# Patient Record
Sex: FEMALE | Race: WHITE | NOT HISPANIC OR LATINO
[De-identification: names, ages, dates, MRNs, and addresses within clinical notes are randomized per-mention and may not be internally consistent; named-entity substitution may affect disease eponyms.]

---

## 2017-08-17 ENCOUNTER — APPOINTMENT (OUTPATIENT)
Dept: OBGYN | Facility: CLINIC | Age: 77
End: 2017-08-17
Payer: MEDICARE

## 2017-08-17 VITALS
HEIGHT: 59 IN | WEIGHT: 190 LBS | DIASTOLIC BLOOD PRESSURE: 68 MMHG | SYSTOLIC BLOOD PRESSURE: 128 MMHG | BODY MASS INDEX: 38.3 KG/M2

## 2017-08-17 DIAGNOSIS — R29.890 LOSS OF HEIGHT: ICD-10-CM

## 2017-08-17 DIAGNOSIS — R19.5 OTHER FECAL ABNORMALITIES: ICD-10-CM

## 2017-08-17 LAB — HEMOCCULT SP1 STL QL: NEGATIVE

## 2017-08-17 PROCEDURE — G0101: CPT

## 2017-08-17 PROCEDURE — 82270 OCCULT BLOOD FECES: CPT

## 2018-08-21 ENCOUNTER — APPOINTMENT (OUTPATIENT)
Dept: OBGYN | Facility: CLINIC | Age: 78
End: 2018-08-21
Payer: MEDICARE

## 2018-08-21 VITALS
WEIGHT: 193 LBS | SYSTOLIC BLOOD PRESSURE: 124 MMHG | HEIGHT: 59 IN | DIASTOLIC BLOOD PRESSURE: 80 MMHG | BODY MASS INDEX: 38.91 KG/M2

## 2018-08-21 PROCEDURE — 99214 OFFICE O/P EST MOD 30 MIN: CPT

## 2019-08-17 ENCOUNTER — TRANSCRIPTION ENCOUNTER (OUTPATIENT)
Age: 79
End: 2019-08-17

## 2019-09-04 ENCOUNTER — APPOINTMENT (OUTPATIENT)
Dept: OBGYN | Facility: CLINIC | Age: 79
End: 2019-09-04
Payer: MEDICARE

## 2019-09-04 VITALS
BODY MASS INDEX: 38.3 KG/M2 | DIASTOLIC BLOOD PRESSURE: 90 MMHG | SYSTOLIC BLOOD PRESSURE: 130 MMHG | WEIGHT: 190 LBS | HEIGHT: 59 IN

## 2019-09-04 DIAGNOSIS — Z12.11 ENCOUNTER FOR SCREENING FOR MALIGNANT NEOPLASM OF COLON: ICD-10-CM

## 2019-09-04 LAB
BILIRUB UR QL STRIP: NORMAL
GLUCOSE UR-MCNC: NORMAL
HCG UR QL: 0.2 EU/DL
HEMOCCULT SP1 STL QL: NEGATIVE
HGB UR QL STRIP.AUTO: NORMAL
KETONES UR-MCNC: NORMAL
LEUKOCYTE ESTERASE UR QL STRIP: ABNORMAL
NITRITE UR QL STRIP: NORMAL
PH UR STRIP: 7
PROT UR STRIP-MCNC: NORMAL
SP GR UR STRIP: 1.01

## 2019-09-04 PROCEDURE — 82270 OCCULT BLOOD FECES: CPT

## 2019-09-04 PROCEDURE — 81003 URINALYSIS AUTO W/O SCOPE: CPT | Mod: QW

## 2019-09-04 PROCEDURE — G0101: CPT

## 2019-09-04 RX ORDER — HYDRALAZINE HCL 50 MG
TABLET ORAL
Refills: 0 | Status: ACTIVE | COMMUNITY

## 2019-10-16 ENCOUNTER — MOBILE ON CALL (OUTPATIENT)
Age: 79
End: 2019-10-16

## 2019-10-16 NOTE — HISTORY OF PRESENT ILLNESS
[1 Year Ago] : 1 year ago [Fair] : being in fair health [Healthy Diet] : a healthy diet [Regular Exercise] : regular exercise [Weight Concerns] : weight concens [Last Mammogram ___] : Last Mammogram was [unfilled] [Last Bone Density ___] : Last bone density studies [unfilled] [Last Colonoscopy ___] : Last colonoscopy [unfilled] [Last Pap ___] : Last cervical pap smear was [unfilled] [Postmenopausal] : is postmenopausal [Definite:  ___ (Date)] : the last menstrual period was [unfilled] [Currently In Menopause] : currently in menopause [Hot Flashes] : no hot flashes [Night Sweats] : no night sweats [Experiencing Menopausal Sxs] : not experiencing menopausal symptoms [Sexually Active] : is not sexually active

## 2019-10-16 NOTE — REVIEW OF SYSTEMS
[Arthralgias] : arthralgias [Joint Pain] : joint pain [Nl] : Integumentary [Joint Stiffness] : no joint stiffness

## 2019-10-23 LAB
APPEARANCE: ABNORMAL
BACTERIA: ABNORMAL
BILIRUBIN URINE: NEGATIVE
BLOOD URINE: NEGATIVE
COLOR: YELLOW
GLUCOSE QUALITATIVE U: NEGATIVE
HYALINE CASTS: 0 /LPF
KETONES URINE: NEGATIVE
LEUKOCYTE ESTERASE URINE: ABNORMAL
MICROSCOPIC-UA: NORMAL
NITRITE URINE: NEGATIVE
PH URINE: 8.5
PROTEIN URINE: NORMAL
RED BLOOD CELLS URINE: 1 /HPF
SPECIFIC GRAVITY URINE: 1.01
SQUAMOUS EPITHELIAL CELLS: >27 /HPF
TRIPLE PHOSPHATE CRYSTALS: ABNORMAL
URINE COMMENTS: NORMAL
UROBILINOGEN URINE: NORMAL
WHITE BLOOD CELLS URINE: 16 /HPF

## 2019-10-24 LAB — BACTERIA UR CULT: ABNORMAL

## 2020-02-19 ENCOUNTER — RESULT REVIEW (OUTPATIENT)
Age: 80
End: 2020-02-19

## 2020-12-29 ENCOUNTER — INPATIENT (INPATIENT)
Facility: HOSPITAL | Age: 80
LOS: 2 days | Discharge: HOME | DRG: 287 | End: 2021-01-01
Attending: STUDENT IN AN ORGANIZED HEALTH CARE EDUCATION/TRAINING PROGRAM | Admitting: HOSPITALIST
Payer: MEDICARE

## 2020-12-29 VITALS
SYSTOLIC BLOOD PRESSURE: 225 MMHG | TEMPERATURE: 100 F | DIASTOLIC BLOOD PRESSURE: 107 MMHG | HEART RATE: 59 BPM | RESPIRATION RATE: 20 BRPM | OXYGEN SATURATION: 100 %

## 2020-12-29 DIAGNOSIS — I16.1 HYPERTENSIVE EMERGENCY: ICD-10-CM

## 2020-12-29 DIAGNOSIS — E03.9 HYPOTHYROIDISM, UNSPECIFIED: ICD-10-CM

## 2020-12-29 DIAGNOSIS — Z29.9 ENCOUNTER FOR PROPHYLACTIC MEASURES, UNSPECIFIED: ICD-10-CM

## 2020-12-29 DIAGNOSIS — I10 ESSENTIAL (PRIMARY) HYPERTENSION: ICD-10-CM

## 2020-12-29 LAB
ALBUMIN SERPL ELPH-MCNC: 3.9 G/DL — SIGNIFICANT CHANGE UP (ref 3.3–5)
ALP SERPL-CCNC: 81 U/L — SIGNIFICANT CHANGE UP (ref 40–120)
ALT FLD-CCNC: 30 U/L — SIGNIFICANT CHANGE UP (ref 12–78)
ANION GAP SERPL CALC-SCNC: 6 MMOL/L — SIGNIFICANT CHANGE UP (ref 5–17)
APTT BLD: 34.3 SEC — SIGNIFICANT CHANGE UP (ref 27.5–35.5)
AST SERPL-CCNC: 32 U/L — SIGNIFICANT CHANGE UP (ref 15–37)
BASOPHILS # BLD AUTO: 0.05 K/UL — SIGNIFICANT CHANGE UP (ref 0–0.2)
BASOPHILS NFR BLD AUTO: 0.7 % — SIGNIFICANT CHANGE UP (ref 0–2)
BILIRUB SERPL-MCNC: 0.6 MG/DL — SIGNIFICANT CHANGE UP (ref 0.2–1.2)
BUN SERPL-MCNC: 31 MG/DL — HIGH (ref 7–23)
CALCIUM SERPL-MCNC: 9.8 MG/DL — SIGNIFICANT CHANGE UP (ref 8.5–10.1)
CHLORIDE SERPL-SCNC: 104 MMOL/L — SIGNIFICANT CHANGE UP (ref 96–108)
CO2 SERPL-SCNC: 26 MMOL/L — SIGNIFICANT CHANGE UP (ref 22–31)
CREAT SERPL-MCNC: 1.06 MG/DL — SIGNIFICANT CHANGE UP (ref 0.5–1.3)
EOSINOPHIL # BLD AUTO: 0.19 K/UL — SIGNIFICANT CHANGE UP (ref 0–0.5)
EOSINOPHIL NFR BLD AUTO: 2.5 % — SIGNIFICANT CHANGE UP (ref 0–6)
GLUCOSE SERPL-MCNC: 102 MG/DL — HIGH (ref 70–99)
HCT VFR BLD CALC: 39.5 % — SIGNIFICANT CHANGE UP (ref 34.5–45)
HGB BLD-MCNC: 13.5 G/DL — SIGNIFICANT CHANGE UP (ref 11.5–15.5)
IMM GRANULOCYTES NFR BLD AUTO: 0.5 % — SIGNIFICANT CHANGE UP (ref 0–1.5)
INR BLD: 1 RATIO — SIGNIFICANT CHANGE UP (ref 0.88–1.16)
LYMPHOCYTES # BLD AUTO: 1.43 K/UL — SIGNIFICANT CHANGE UP (ref 1–3.3)
LYMPHOCYTES # BLD AUTO: 18.9 % — SIGNIFICANT CHANGE UP (ref 13–44)
MAGNESIUM SERPL-MCNC: 2.1 MG/DL — SIGNIFICANT CHANGE UP (ref 1.6–2.6)
MCHC RBC-ENTMCNC: 30.4 PG — SIGNIFICANT CHANGE UP (ref 27–34)
MCHC RBC-ENTMCNC: 34.2 GM/DL — SIGNIFICANT CHANGE UP (ref 32–36)
MCV RBC AUTO: 89 FL — SIGNIFICANT CHANGE UP (ref 80–100)
MONOCYTES # BLD AUTO: 0.44 K/UL — SIGNIFICANT CHANGE UP (ref 0–0.9)
MONOCYTES NFR BLD AUTO: 5.8 % — SIGNIFICANT CHANGE UP (ref 2–14)
NEUTROPHILS # BLD AUTO: 5.42 K/UL — SIGNIFICANT CHANGE UP (ref 1.8–7.4)
NEUTROPHILS NFR BLD AUTO: 71.6 % — SIGNIFICANT CHANGE UP (ref 43–77)
NT-PROBNP SERPL-SCNC: 144 PG/ML — SIGNIFICANT CHANGE UP (ref 0–450)
PLATELET # BLD AUTO: 196 K/UL — SIGNIFICANT CHANGE UP (ref 150–400)
POTASSIUM SERPL-MCNC: 4.2 MMOL/L — SIGNIFICANT CHANGE UP (ref 3.5–5.3)
POTASSIUM SERPL-SCNC: 4.2 MMOL/L — SIGNIFICANT CHANGE UP (ref 3.5–5.3)
PROT SERPL-MCNC: 7.6 GM/DL — SIGNIFICANT CHANGE UP (ref 6–8.3)
PROTHROM AB SERPL-ACNC: 11.7 SEC — SIGNIFICANT CHANGE UP (ref 10.6–13.6)
RBC # BLD: 4.44 M/UL — SIGNIFICANT CHANGE UP (ref 3.8–5.2)
RBC # FLD: 13.2 % — SIGNIFICANT CHANGE UP (ref 10.3–14.5)
SODIUM SERPL-SCNC: 136 MMOL/L — SIGNIFICANT CHANGE UP (ref 135–145)
TROPONIN I SERPL-MCNC: 0.02 NG/ML — SIGNIFICANT CHANGE UP (ref 0.01–0.04)
TROPONIN I SERPL-MCNC: 0.6 NG/ML — HIGH (ref 0.01–0.04)
TROPONIN I SERPL-MCNC: 0.84 NG/ML — HIGH (ref 0.01–0.04)
WBC # BLD: 7.57 K/UL — SIGNIFICANT CHANGE UP (ref 3.8–10.5)
WBC # FLD AUTO: 7.57 K/UL — SIGNIFICANT CHANGE UP (ref 3.8–10.5)

## 2020-12-29 PROCEDURE — C1769: CPT

## 2020-12-29 PROCEDURE — 36415 COLL VENOUS BLD VENIPUNCTURE: CPT

## 2020-12-29 PROCEDURE — 84443 ASSAY THYROID STIM HORMONE: CPT

## 2020-12-29 PROCEDURE — 83036 HEMOGLOBIN GLYCOSYLATED A1C: CPT

## 2020-12-29 PROCEDURE — 82962 GLUCOSE BLOOD TEST: CPT

## 2020-12-29 PROCEDURE — C1894: CPT

## 2020-12-29 PROCEDURE — 85027 COMPLETE CBC AUTOMATED: CPT

## 2020-12-29 PROCEDURE — 84484 ASSAY OF TROPONIN QUANT: CPT

## 2020-12-29 PROCEDURE — 85730 THROMBOPLASTIN TIME PARTIAL: CPT

## 2020-12-29 PROCEDURE — 95816 EEG AWAKE AND DROWSY: CPT

## 2020-12-29 PROCEDURE — 82533 TOTAL CORTISOL: CPT

## 2020-12-29 PROCEDURE — C1887: CPT

## 2020-12-29 PROCEDURE — 80061 LIPID PANEL: CPT

## 2020-12-29 PROCEDURE — 93306 TTE W/DOPPLER COMPLETE: CPT

## 2020-12-29 PROCEDURE — 70450 CT HEAD/BRAIN W/O DYE: CPT | Mod: 26

## 2020-12-29 PROCEDURE — 93010 ELECTROCARDIOGRAM REPORT: CPT | Mod: 76

## 2020-12-29 PROCEDURE — C1760: CPT

## 2020-12-29 PROCEDURE — A9500: CPT

## 2020-12-29 PROCEDURE — 86769 SARS-COV-2 COVID-19 ANTIBODY: CPT

## 2020-12-29 PROCEDURE — 78452 HT MUSCLE IMAGE SPECT MULT: CPT

## 2020-12-29 PROCEDURE — 93458 L HRT ARTERY/VENTRICLE ANGIO: CPT

## 2020-12-29 PROCEDURE — 99223 1ST HOSP IP/OBS HIGH 75: CPT

## 2020-12-29 PROCEDURE — 70450 CT HEAD/BRAIN W/O DYE: CPT

## 2020-12-29 PROCEDURE — G0269: CPT

## 2020-12-29 PROCEDURE — 80048 BASIC METABOLIC PNL TOTAL CA: CPT

## 2020-12-29 PROCEDURE — U0003: CPT

## 2020-12-29 PROCEDURE — 71045 X-RAY EXAM CHEST 1 VIEW: CPT | Mod: 26

## 2020-12-29 RX ORDER — AMLODIPINE BESYLATE 2.5 MG/1
5 TABLET ORAL DAILY
Refills: 0 | Status: DISCONTINUED | OUTPATIENT
Start: 2020-12-29 | End: 2020-12-30

## 2020-12-29 RX ORDER — PROPRANOLOL HCL 160 MG
60 CAPSULE, EXTENDED RELEASE 24HR ORAL DAILY
Refills: 0 | Status: DISCONTINUED | OUTPATIENT
Start: 2020-12-29 | End: 2020-12-30

## 2020-12-29 RX ORDER — VALSARTAN 80 MG/1
320 TABLET ORAL DAILY
Refills: 0 | Status: DISCONTINUED | OUTPATIENT
Start: 2020-12-29 | End: 2021-01-01

## 2020-12-29 RX ORDER — LABETALOL HCL 100 MG
20 TABLET ORAL ONCE
Refills: 0 | Status: COMPLETED | OUTPATIENT
Start: 2020-12-29 | End: 2020-12-29

## 2020-12-29 RX ORDER — ASPIRIN/CALCIUM CARB/MAGNESIUM 324 MG
81 TABLET ORAL DAILY
Refills: 0 | Status: DISCONTINUED | OUTPATIENT
Start: 2020-12-29 | End: 2021-01-01

## 2020-12-29 RX ORDER — LEVOTHYROXINE SODIUM 125 MCG
25 TABLET ORAL DAILY
Refills: 0 | Status: DISCONTINUED | OUTPATIENT
Start: 2020-12-29 | End: 2021-01-01

## 2020-12-29 RX ORDER — ASPIRIN/CALCIUM CARB/MAGNESIUM 324 MG
325 TABLET ORAL ONCE
Refills: 0 | Status: COMPLETED | OUTPATIENT
Start: 2020-12-29 | End: 2020-12-29

## 2020-12-29 RX ORDER — LABETALOL HCL 100 MG
10 TABLET ORAL ONCE
Refills: 0 | Status: COMPLETED | OUTPATIENT
Start: 2020-12-29 | End: 2020-12-29

## 2020-12-29 RX ORDER — HYDRALAZINE HCL 50 MG
50 TABLET ORAL
Refills: 0 | Status: DISCONTINUED | OUTPATIENT
Start: 2020-12-29 | End: 2020-12-30

## 2020-12-29 RX ORDER — ENOXAPARIN SODIUM 100 MG/ML
40 INJECTION SUBCUTANEOUS EVERY 12 HOURS
Refills: 0 | Status: DISCONTINUED | OUTPATIENT
Start: 2020-12-29 | End: 2020-12-30

## 2020-12-29 RX ORDER — HYDRALAZINE HCL 50 MG
5 TABLET ORAL ONCE
Refills: 0 | Status: COMPLETED | OUTPATIENT
Start: 2020-12-29 | End: 2020-12-29

## 2020-12-29 RX ADMIN — Medication 20 MILLIGRAM(S): at 18:27

## 2020-12-29 RX ADMIN — ENOXAPARIN SODIUM 40 MILLIGRAM(S): 100 INJECTION SUBCUTANEOUS at 21:26

## 2020-12-29 RX ADMIN — Medication 50 MILLIGRAM(S): at 21:26

## 2020-12-29 RX ADMIN — Medication 10 MILLIGRAM(S): at 15:38

## 2020-12-29 RX ADMIN — Medication 10 MILLIGRAM(S): at 17:19

## 2020-12-29 RX ADMIN — Medication 325 MILLIGRAM(S): at 19:37

## 2020-12-29 RX ADMIN — AMLODIPINE BESYLATE 5 MILLIGRAM(S): 2.5 TABLET ORAL at 22:22

## 2020-12-29 RX ADMIN — Medication 5 MILLIGRAM(S): at 23:48

## 2020-12-29 NOTE — ED PROVIDER NOTE - PROGRESS NOTE DETAILS
Cherry HARRINGTON for ED attending, Dr. Davalos: Spoke with tele hospitalist for admission after speaking with Dr. Noriega hospitalist will admit pt. I Gladys Gu attest that this documentation has been prepared under the direction and in the presence of Doctor Davalos .

## 2020-12-29 NOTE — ED ADULT NURSE REASSESSMENT NOTE - NS ED NURSE REASSESS COMMENT FT1
Dr. Davalos informed of Troponin lab results. Repeat EKG in progress. Pt denies CP at this time. Will continue to monitor

## 2020-12-29 NOTE — H&P ADULT - ATTENDING COMMENTS
pt seen and evaluated after tele hospitalist evaluation. Bedside physical performed. Refer to above for detailed plan      #Hypertensive emergency  -possibly due to recent BB titration  -given hydralazine and labetalol in the ED  -resume home meds  -get morning echo  -monitor bp    #Elevated Troponin  -likely secondary to above  -monitor on tele  -trend troponin  -endorsed intermittent chest pain  -get echo  -serial EKG  -get cards evaluation

## 2020-12-29 NOTE — H&P ADULT - HISTORY OF PRESENT ILLNESS
81 y/o female with PMHx of HTN, Alopecia totalis, Diverticulitis, Diverticulosis, Hypothyroidism presents to the ED c/o CP. Pt went to PMD last week and found to have low pulse. PMD decreased HTN medication, Inderal from 120 to 60 mg daily. Patient reports she woke up this morning feeling fine, then started to feel dizzy, noticed blurred vision, noted some left-sided CP, back pain. Used a BP machine and noted her BP was in the 200s. Called cardiologist, Dr. Moreno, who instructed her to come to ED. Family hHx of HTN and CAD. Denies smoking or Etoh. Reports adherence to medication regimen    In the ED, VS Temp 99.8 HR: 59 BP: 225/107à 182/69 R; 20 SpO2 100% on RA . Labs included CBC wnl. PTT/PT/INR wnl. BMP wnl. LFTS unremarkable. Trop I 0.020, 0.0602.  EKG per ED documentation was noted for no ST changes. Imaging included CXR, which was neg for acute cardiopulmonary abnormalities.  Given a total of Labetalol 40 mg IV and  mg and admitted to medicine for further management.  Patient is currently denies any headache lightheaded, blurred vision, chest pain, Sob, n/v, abdominal or back pain, numbness, tingling, fevers or chills.

## 2020-12-29 NOTE — ED ADULT NURSE NOTE - OBJECTIVE STATEMENT
80y female presents to ED with hypertension and slight chest pain. Pt states that she takes BP meds at home. Pt states that her PCP recently lowered her Indaril dose in half due to low pulse rate. Pt states that she had vision changes last night while watching TV. Slight headache at this time. Denies SOB 80y female presents to ED with hypertension and slight chest pain. Pt states that she takes BP meds at home. Pt states that her PCP recently lowered her Indaril dose in half due to low pulse rate. Pt states that she had vision changes last night while watching TV, but is now back at baseline. Slight headache at this time 3/10. Denies SOB at this time 80y female presents to ED with hypertension and slight chest pain. Pt states that she takes BP meds at home. Pt states that her PCP recently lowered her Indaril dose in half due to low pulse rate. Pt states that she had vision changes last night while watching TV, but is now back at baseline. Slight headache at this time 3/10. Denies SOB at this time. Pt states that there is an extensive family hx of HTN

## 2020-12-29 NOTE — ED PROVIDER NOTE - NS_ ATTENDINGSCRIBEDETAILS _ED_A_ED_FT
I, Jake Davalos MD,  performed the initial face to face bedside interview with this patient regarding history of present illness, review of symptoms and relevant past medical, social and family history.  I completed an independent physical examination.    The history, relevant review of systems, past medical and surgical history, medical decision making, and physical examination was documented by the scribe in my presence and I attest to the accuracy of the documentation.

## 2020-12-29 NOTE — ED PROVIDER NOTE - PMH
Alopecia (Capitis) Totalis  wears wig  Diverticulitis of Large Intestine  on and off since 2003. Tx with Abx and bowel rest on prior exacerbations  Diverticulosis of the Colon    H/O: Hypothyroidism  in remote past, no longer needs med  History of Obesity    HTN (Hypertension)

## 2020-12-29 NOTE — H&P ADULT - NSICDXPASTSURGICALHX_GEN_ALL_CORE_FT
PAST SURGICAL HISTORY:  Hemorrhoids had surgery to correct     (Normal Spontaneous Vaginal Delivery) x2

## 2020-12-29 NOTE — ED PROVIDER NOTE - OBJECTIVE STATEMENT
81 y/o female with PMHx of HTN, Alopecia totalis, Diverticulitis, Diverticulosis, Hypothyroidism presents to the ED c/o CP. Pt went to PMD last week and found to have low pulse. PMD decreased HTN medication, Inderal. Pt woke up this morning feeling fine, then started to feel dizzy, CP, back pain. Used a BP machine and noted her BP was in the 200s. Called cardiologist, Dr. Moreno, who instructed her to come to ED.

## 2020-12-29 NOTE — H&P ADULT - ASSESSMENT
81 y/o female with PMHx of HTN, Alopecia totalis, Diverticulitis, Diverticulosis, Hypothyroidism presents to the ED c/o CP found to elevated troponin without EKGs changes likely 2/2 to demand ischemia in the setting of hypertensive emergency. Admitted for cardio/neuro workup

## 2020-12-29 NOTE — H&P ADULT - NSICDXPASTMEDICALHX_GEN_ALL_CORE_FT
PAST MEDICAL HISTORY:  Alopecia (Capitis) Totalis wears wig    Diverticulitis of Large Intestine on and off since 2003. Tx with Abx and bowel rest on prior exacerbations    Diverticulosis of the Colon     H/O: Hypothyroidism in remote past, no longer needs med    History of Obesity     HTN (Hypertension)

## 2020-12-29 NOTE — H&P ADULT - PROBLEM SELECTOR PLAN 1
BP goal is reduction of BP by 10-10% in 1 hour and 15 percent in the next 23 hours. So here targeted BP is <160/110 currently patient is 182/62. Per patient BP is usually 140s-150s/ 70s-80s   Restart home medications; Inderal 60 mg XR, HTCZ 12.5 mg, Hydralazine 50 mg BID, Valsartan 320 mg daily , would increase morning HTCZ  and consider switching to chlothidaone  for better BP control long term BP control or start amlodipine 5 mg   Cards consult in the AM  HR is now in 50s, would switch hydralazine IV or CCB     Obtain CT head w/o contrast  given c/o  dizziness and blurred vision upon  Trend Trops  Cont on Tele   s/p  mg, cont with ASA 81 mg daily   Trend Cr

## 2020-12-30 LAB
A1C WITH ESTIMATED AVERAGE GLUCOSE RESULT: 5.6 % — SIGNIFICANT CHANGE UP (ref 4–5.6)
ANION GAP SERPL CALC-SCNC: 3 MMOL/L — LOW (ref 5–17)
APTT BLD: 61.7 SEC — HIGH (ref 27.5–35.5)
BUN SERPL-MCNC: 24 MG/DL — HIGH (ref 7–23)
CALCIUM SERPL-MCNC: 10.1 MG/DL — SIGNIFICANT CHANGE UP (ref 8.5–10.1)
CHLORIDE SERPL-SCNC: 107 MMOL/L — SIGNIFICANT CHANGE UP (ref 96–108)
CHOLEST SERPL-MCNC: 217 MG/DL — HIGH
CO2 SERPL-SCNC: 28 MMOL/L — SIGNIFICANT CHANGE UP (ref 22–31)
CREAT SERPL-MCNC: 1.05 MG/DL — SIGNIFICANT CHANGE UP (ref 0.5–1.3)
ESTIMATED AVERAGE GLUCOSE: 114 MG/DL — SIGNIFICANT CHANGE UP (ref 68–114)
GLUCOSE SERPL-MCNC: 120 MG/DL — HIGH (ref 70–99)
HCT VFR BLD CALC: 36.6 % — SIGNIFICANT CHANGE UP (ref 34.5–45)
HDLC SERPL-MCNC: 66 MG/DL — SIGNIFICANT CHANGE UP
HGB BLD-MCNC: 12.6 G/DL — SIGNIFICANT CHANGE UP (ref 11.5–15.5)
LIPID PNL WITH DIRECT LDL SERPL: 128 MG/DL — HIGH
MCHC RBC-ENTMCNC: 30.2 PG — SIGNIFICANT CHANGE UP (ref 27–34)
MCHC RBC-ENTMCNC: 34.4 GM/DL — SIGNIFICANT CHANGE UP (ref 32–36)
MCV RBC AUTO: 87.8 FL — SIGNIFICANT CHANGE UP (ref 80–100)
NON HDL CHOLESTEROL: 150 MG/DL — HIGH
PLATELET # BLD AUTO: 184 K/UL — SIGNIFICANT CHANGE UP (ref 150–400)
POTASSIUM SERPL-MCNC: 3.8 MMOL/L — SIGNIFICANT CHANGE UP (ref 3.5–5.3)
POTASSIUM SERPL-SCNC: 3.8 MMOL/L — SIGNIFICANT CHANGE UP (ref 3.5–5.3)
RBC # BLD: 4.17 M/UL — SIGNIFICANT CHANGE UP (ref 3.8–5.2)
RBC # FLD: 13.4 % — SIGNIFICANT CHANGE UP (ref 10.3–14.5)
SARS-COV-2 IGG SERPL QL IA: NEGATIVE — SIGNIFICANT CHANGE UP
SARS-COV-2 IGM SERPL IA-ACNC: 0.07 INDEX — SIGNIFICANT CHANGE UP
SARS-COV-2 RNA SPEC QL NAA+PROBE: SIGNIFICANT CHANGE UP
SODIUM SERPL-SCNC: 138 MMOL/L — SIGNIFICANT CHANGE UP (ref 135–145)
TRIGL SERPL-MCNC: 110 MG/DL — SIGNIFICANT CHANGE UP
TROPONIN I SERPL-MCNC: 0.81 NG/ML — HIGH (ref 0.01–0.04)
TROPONIN I SERPL-MCNC: 0.94 NG/ML — HIGH (ref 0.01–0.04)
WBC # BLD: 7.24 K/UL — SIGNIFICANT CHANGE UP (ref 3.8–10.5)
WBC # FLD AUTO: 7.24 K/UL — SIGNIFICANT CHANGE UP (ref 3.8–10.5)

## 2020-12-30 PROCEDURE — 99233 SBSQ HOSP IP/OBS HIGH 50: CPT

## 2020-12-30 PROCEDURE — 95816 EEG AWAKE AND DROWSY: CPT | Mod: 26

## 2020-12-30 PROCEDURE — 70450 CT HEAD/BRAIN W/O DYE: CPT | Mod: 26

## 2020-12-30 PROCEDURE — 78452 HT MUSCLE IMAGE SPECT MULT: CPT | Mod: 26

## 2020-12-30 PROCEDURE — 99223 1ST HOSP IP/OBS HIGH 75: CPT

## 2020-12-30 RX ORDER — HEPARIN SODIUM 5000 [USP'U]/ML
5000 INJECTION INTRAVENOUS; SUBCUTANEOUS ONCE
Refills: 0 | Status: COMPLETED | OUTPATIENT
Start: 2020-12-30 | End: 2020-12-30

## 2020-12-30 RX ORDER — HEPARIN SODIUM 5000 [USP'U]/ML
5300 INJECTION INTRAVENOUS; SUBCUTANEOUS EVERY 6 HOURS
Refills: 0 | Status: DISCONTINUED | OUTPATIENT
Start: 2020-12-30 | End: 2020-12-30

## 2020-12-30 RX ORDER — LABETALOL HCL 100 MG
200 TABLET ORAL
Refills: 0 | Status: DISCONTINUED | OUTPATIENT
Start: 2020-12-30 | End: 2020-12-30

## 2020-12-30 RX ORDER — HEPARIN SODIUM 5000 [USP'U]/ML
INJECTION INTRAVENOUS; SUBCUTANEOUS
Qty: 25000 | Refills: 0 | Status: DISCONTINUED | OUTPATIENT
Start: 2020-12-30 | End: 2020-12-30

## 2020-12-30 RX ORDER — HEPARIN SODIUM 5000 [USP'U]/ML
5000 INJECTION INTRAVENOUS; SUBCUTANEOUS ONCE
Refills: 0 | Status: DISCONTINUED | OUTPATIENT
Start: 2020-12-30 | End: 2020-12-30

## 2020-12-30 RX ORDER — LABETALOL HCL 100 MG
100 TABLET ORAL EVERY 12 HOURS
Refills: 0 | Status: DISCONTINUED | OUTPATIENT
Start: 2020-12-30 | End: 2021-01-01

## 2020-12-30 RX ORDER — ENOXAPARIN SODIUM 100 MG/ML
40 INJECTION SUBCUTANEOUS DAILY
Refills: 0 | Status: DISCONTINUED | OUTPATIENT
Start: 2020-12-30 | End: 2021-01-01

## 2020-12-30 RX ADMIN — Medication 100 MILLIGRAM(S): at 21:53

## 2020-12-30 RX ADMIN — HEPARIN SODIUM 1000 UNIT(S)/HR: 5000 INJECTION INTRAVENOUS; SUBCUTANEOUS at 11:04

## 2020-12-30 RX ADMIN — HEPARIN SODIUM 5000 UNIT(S): 5000 INJECTION INTRAVENOUS; SUBCUTANEOUS at 04:00

## 2020-12-30 RX ADMIN — HEPARIN SODIUM 1000 UNIT(S)/HR: 5000 INJECTION INTRAVENOUS; SUBCUTANEOUS at 04:00

## 2020-12-30 RX ADMIN — Medication 200 MILLIGRAM(S): at 09:04

## 2020-12-30 RX ADMIN — VALSARTAN 320 MILLIGRAM(S): 80 TABLET ORAL at 09:05

## 2020-12-30 RX ADMIN — Medication 50 MILLIGRAM(S): at 09:04

## 2020-12-30 RX ADMIN — ENOXAPARIN SODIUM 40 MILLIGRAM(S): 100 INJECTION SUBCUTANEOUS at 19:02

## 2020-12-30 RX ADMIN — Medication 81 MILLIGRAM(S): at 10:45

## 2020-12-30 RX ADMIN — Medication 25 MICROGRAM(S): at 05:47

## 2020-12-30 NOTE — PROVIDER CONTACT NOTE (CHANGE IN STATUS NOTIFICATION) - ASSESSMENT
Patient unresponsive for several minutes. B/P 176/121 HR 49 Patient lost control of bladder, eyes fixed.

## 2020-12-30 NOTE — ED ADULT NURSE REASSESSMENT NOTE - NS ED NURSE REASSESS COMMENT FT1
dr. cowan made aware of third troponin result of 0.838. repeat troponin being drawn now. patient denies chest pain. patient resting comfortably in stretcher. will continue to monitor.

## 2020-12-30 NOTE — CONSULT NOTE ADULT - SUBJECTIVE AND OBJECTIVE BOX
CC: 80 y old  Female who presents with a chief complaint of Chest pain / Hypertensive emergency (30 Dec 2020 06:53)      HPI:  81 y/o female with PMHx of HTN, Alopecia totalis, Diverticulitis, Hypothyroidism presents to the ED with c/o dizziness, blurred vision, noted some left-sided CP, back pain, checked her BP was in the 200s ( PMD had decreased HTN medication, Inderal from 120 to 60 mg daily) In the ED, HR: 59 BP: 225/107 - 182/69; Trop I 0.020, 0.0602. Pt was admitted to [facility], treated with antihypertensive meds, she went for Nuclear stress test tis morning, was geiven Labetalol, Hydralizine and Valsartan before the test.    At 9.36 hours today while pt was having Nuclear stress test she became unresponsive, Code stroke was called, she was noted to have eye rolling movements and urinary incontinnece, was unresponsive breifly, /120-161/75; hr 59,  came around instantly, is not aware of what happened, NIHSS - 0, pt sent for stat CT head - was negative    ROS: denies headache lightheaded, blurred vision, chest pain, Sob, n/v, numbness, tinglings.    (29 Dec 2020 21:07)      PAST MEDICAL & SURGICAL HISTORY:  Alopecia (Capitis) Totalis  Obesity  Diverticulitis of Large Intestine  H/O: Hypothyroidism  HTN (Hypertension)  Hemorrhoids   (Normal Spontaneous Vaginal Delivery)      FAMILY HISTORY:  FH: HTN (hypertension)      Social Hx:  Nonsmoker, no drug or alcohol use      MEDICATIONS  (STANDING):  aspirin enteric coated 81 milliGRAM(s) Oral daily  heparin  Infusion.  Unit(s)/Hr (10 mL/Hr) IV Continuous <Continuous>  hydrALAZINE 50 milliGRAM(s) Oral two times a day  hydrochlorothiazide Oral Tab/Cap - Peds 12.5 milliGRAM(s) Oral daily  labetalol 200 milliGRAM(s) Oral two times a day  levothyroxine 25 MICROGram(s) Oral daily  valsartan 320 milliGRAM(s) Oral daily       Allergies    Bactrim (Rash)  Cipro (Unknown)  codeine (Other)  dust (Rhinitis)  Flagyl (Diarrhea)  Keflex (Unknown)  Levaquin (Other)  Levaquin side effect is muscle pain not rigors as previously indicated. Unable to change in [place] (Other)  mold, trees, grasses, dust mites (Rhinitis; Rhinorrhea; Sneezing)  morphine (Other)  Originally Entered as [Unknown] reaction to [sulfur] (Unknown)  penicillin (Swelling)      ROS: Pertinent positives in HPI, all other ROS were reviewed and are negative.        Vital Signs Last 24 Hrs  T(C): 36.4 (30 Dec 2020 10:15), Max: 37.7 (29 Dec 2020 14:14)  T(F): 97.5 (30 Dec 2020 10:15), Max: 99.8 (29 Dec 2020 14:14)  HR: 53 (30 Dec 2020 10:15) (53 - 68)  BP: 109/53 (30 Dec 2020 10:15) (109/53 - 225/107)  BP(mean): 105 (29 Dec 2020 17:18) (105 - 121)  RR: 16 (30 Dec 2020 10:15) (16 - 20)  SpO2: 98% (30 Dec 2020 10:15) (97% - 100%)      Gen exam:  Normocephalic with Alopecia, awake and alert.  HEENT: PERRLA, EOMI,   Neck: Supple.  Respiratory: Breath sounds are clear bilaterally  Cardiovascular: S1 and S2, regular / irregular rhythm  Extremities:  no edema  Vascular: Caritid Bruit - no  Musculoskeletal:  no abnormal movements  Skin: No rashes    Neurological exam:  HF: A x O x 3. Appropriately interactive, normal affect. Speech fluent, No Aphasia or paraphasic errors. Naming /repetition intact   CN: PARAM, EOMI, VFF, facial sensation normal, no NLFD, tongue midline, Palate moves equally, SCM equal bilaterally  Motor: No pronator drift, Strength 5/5 in all 4 ext, normal bulk and tone, no tremor.    Sens: Intact to light touch / PP    Reflexes: Symmetric and normal . downgoing toes b/l  Coord:  No FNFA, dysmetria, SUMAN intact   Gait/Balance: Not tested    NIHSS: 0          Labs:   12-30    138  |  107  |  24<H>  ----------------------------<  120<H>  3.8   |  28  |  1.05    Ca    10.1      30 Dec 2020 06:09  Mg     2.1     12-29    TPro  7.6  /  Alb  3.9  /  TBili  0.6  /  DBili  x   /  AST  32  /  ALT  30  /  AlkPhos  81                            12.6   7.24  )-----------( 184      ( 30 Dec 2020 10:25 )             36.6       Radiology:  < from: CT Brain Stroke Protocol (20 @ 10:05) >  FINDINGS:    There is no acute intracranial hemorrhage or mass effect. There are areas of hypodensity in the bilateral hemispheric white matter suggesting white matter microvascular ischemic change. There is cerebral volume loss.    There is no extraaxial fluid collection.    There is no displaced calvarial fracture. The visualized orbits are within normal limits. The visualized portions of the paranasal sinuses are well aerated. The mastoid air cells are well aerated.      IMPRESSION: No acute intracranial hemorrhage or mass effect.  If clinical suspicion for acute infarct persists, brain MRI can be obtained if there is no contraindication.    These findings were discussed with Dr. Barcenas at 2020 10:24 AM by Dr. Duke Welsh with read back confirmation.            < end of copied text >    
Patient is a 80y old  Female who presents with a chief complaint of Chest pain Hypertensive emergency (29 Dec 2020 21:07)      HPI:  79 y/o female with PMHx of HTN, Alopecia totalis, Diverticulitis, Diverticulosis, Hypothyroidism presents to the ED c/o CP. Pt went to PMD last week and found to have low pulse. PMD decreased HTN medication, Inderal from 120 to 60 mg daily. Patient reports she woke up this morning feeling fine, then started to feel dizzy, noticed blurred vision, noted some left-sided CP, back pain. Used a BP machine and noted her BP was in the 200s. Called cardiologist, Dr. Moreno, who instructed her to come to ED. Family hHx of HTN and CAD. Denies smoking or Etoh. Reports adherence to medication regimen    In the ED, VS Temp 99.8 HR: 59 BP: 225/107à 182/69 R; 20 SpO2 100% on RA . Labs included CBC wnl. PTT/PT/INR wnl. BMP wnl. LFTS unremarkable. Trop I 0.020, 0.0602.  EKG per ED documentation was noted for no ST changes. Imaging included CXR, which was neg for acute cardiopulmonary abnormalities.  Given a total of Labetalol 40 mg IV and  mg and admitted to medicine for further management.  Patient is currently denies any headache lightheaded, blurred vision, chest pain, Sob, n/v, abdominal or back pain, numbness, tingling, fevers or chills.    (29 Dec 2020 21:07)    She takes inderal , hydralzine,valsarten at home , currently Sx free  PAST MEDICAL & SURGICAL HISTORY:  Alopecia (Capitis) Totalis  wears wig    History of Obesity    Diverticulitis of Large Intestine  on and off since . Tx with Abx and bowel rest on prior exacerbations    Diverticulosis of the Colon    H/O: Hypothyroidism  in remote past, no longer needs med    HTN (Hypertension)    Hemorrhoids  had surgery to correct     (Normal Spontaneous Vaginal Delivery)  x2                                      MEDICATIONS  (STANDING):  amLODIPine   Tablet 5 milliGRAM(s) Oral daily  aspirin enteric coated 81 milliGRAM(s) Oral daily  heparin  Infusion.  Unit(s)/Hr (10 mL/Hr) IV Continuous <Continuous>  hydrALAZINE 50 milliGRAM(s) Oral two times a day  hydrochlorothiazide Oral Tab/Cap - Peds 12.5 milliGRAM(s) Oral daily  levothyroxine 25 MICROGram(s) Oral daily  propranolol LA 60 milliGRAM(s) Oral daily  valsartan 320 milliGRAM(s) Oral daily    MEDICATIONS  (PRN):  heparin   Injectable 5300 Unit(s) IV Push every 6 hours PRN For aPTT less than 40      FAMILY HISTORY:  FH: HTN (hypertension)        SOCIAL HISTORY:    CIGARETTES:        Vital Signs Last 24 Hrs  T(C): 37.1 (30 Dec 2020 05:35), Max: 37.7 (29 Dec 2020 14:14)  T(F): 98.8 (30 Dec 2020 05:35), Max: 99.8 (29 Dec 2020 14:14)  HR: 64 (30 Dec 2020 05:35) (59 - 68)  BP: 175/71 (30 Dec 2020 05:35) (169/55 - 225/107)  BP(mean): 105 (29 Dec 2020 17:18) (105 - 121)  RR: 18 (30 Dec 2020 05:35) (18 - 20)  SpO2: 97% (30 Dec 2020 05:35) (97% - 100%)            INTERPRETATION OF TELEMETRY:    ECG:    I&O's Detail      LABS:                        13.5   7.57  )-----------( 196      ( 29 Dec 2020 14:41 )             39.5         136  |  104  |  31<H>  ----------------------------<  102<H>  4.2   |  26  |  1.06    Ca    9.8      29 Dec 2020 14:41  Mg     2.1         TPro  7.6  /  Alb  3.9  /  TBili  0.6  /  DBili  x   /  AST  32  /  ALT  30  /  AlkPhos  81  12    CARDIAC MARKERS ( 30 Dec 2020 01:19 )  0.940 ng/mL / x     / x     / x     / x      CARDIAC MARKERS ( 29 Dec 2020 22:44 )  0.838 ng/mL / x     / x     / x     / x      CARDIAC MARKERS ( 29 Dec 2020 18:13 )  0.602 ng/mL / x     / x     / x     / x      CARDIAC MARKERS ( 29 Dec 2020 14:41 )  0.020 ng/mL / x     / x     / x     / x          PT/INR - ( 29 Dec 2020 14:41 )   PT: 11.7 sec;   INR: 1.00 ratio         PTT - ( 29 Dec 2020 14:41 )  PTT:34.3 sec    I&O's Summary    BNPSerum Pro-Brain Natriuretic Peptide: 144 pg/mL ( @ 14:41)    RADIOLOGY & ADDITIONAL STUDIES:

## 2020-12-30 NOTE — PROVIDER CONTACT NOTE (CHANGE IN STATUS NOTIFICATION) - ACTION/TREATMENT ORDERED:
Patient responding. Speaking appropriately, A&Ox3 no slurring noted. Weak. Sent for CT of head. Stress test cancelled.

## 2020-12-30 NOTE — CONSULT NOTE ADULT - ASSESSMENT
79 y/o female with PMHx of HTN, Alopecia totalis, Diverticulitis, Hypothyroidism presents to the ED with c/o dizziness, blurred vision, noted some left-sided CP, back pain, noted to have uncontrolled HTN medication; BP: 225/107 - 182/69; Trop I 0.020, 0.0602. Pt went for Nuclear stress test this morning, was geiven Labetalol, Hydralizine and Valsartan before the test, At 9.36 hours today while pt was having Nuclear stress test she became unresponsive, Code stroke was called, she was noted to have eye rolling movements, urinary incontinnece, was unresponsive breifly, /120-161/75; hr 59,  came around instantly, is not aware of what happened, NIHSS - 0, stat CT head - was negative (seen in [facility] at about 8.30 by RN)    # Most likely a brief seizure    - Obtain EEG today, if abnormal will consider treatment options, otherwise observation    # unlikley TIA or stroke, given Symptoms, not  a Tpa candidate    Above D/W pt and Swapna Godinez
79 y/o female with PMHx of HTN, Alopecia totalis, Diverticulitis, Diverticulosis, Hypothyroidism presents to the ED c/o CP. Pt went to PMD last week and found to have low pulse. PMD decreased HTN medication, Inderal from 120 to 60 mg daily. Patient reports she woke up this morning feeling fine, then started to feel dizzy, noticed blurred vision, noted some left-sided CP, back pain. Used a BP machine and noted her BP was in the 200s. Called cardiologist, Dr. Moreno, who instructed her to come to ED. Family hHx of HTN and CAD. Denies smoking or Etoh. Reports adherence to medication regimen    In the ED, VS Temp 99.8 HR: 59 BP: 225/107à 182/69 R; 20 SpO2 100% on RA . Labs included CBC wnl. PTT/PT/INR wnl. BMP wnl. LFTS unremarkable. Trop I 0.020, 0.0602.  EKG per ED documentation was noted for no ST changes. Imaging included CXR, which was neg for acute cardiopulmonary abnormalities.  Given a total of Labetalol 40 mg IV and  mg and admitted to medicine for further management.  Patient is currently denies any headache lightheaded, blurred vision, chest pain, Sob, n/v, abdominal or back pain, numbness, tingling, fevers or chills.    (29 Dec 2020 21:07)    She takes inderal , hydralzine,valsarten at home , currently Sx free  pos trop trending up likely from sig HTN but need to r/o active ischemia  1) SPECT today if negative can DC home once BP better controlled   2) change inderal to labetolol 200 mg BID hold for HR less than 50   3) cont Valsarten/ hydralazine DC amlodipine   4) echo either as inpt or oupt

## 2020-12-30 NOTE — PROVIDER CONTACT NOTE (CHANGE IN STATUS NOTIFICATION) - BACKGROUND
Bradycardia with severe hypertension on admission. C/o dizziness and blurred vision. Trops elevated.  In department for cardiac stress test.

## 2020-12-30 NOTE — PROVIDER CONTACT NOTE (CHANGE IN STATUS NOTIFICATION) - SITUATION
Patient in Nuclear medicine just finished rest imaging.  According to tech patient c/o feeling dizzy, started to fall back and became unresponsive.

## 2020-12-31 LAB
HCT VFR BLD CALC: 35.2 % — SIGNIFICANT CHANGE UP (ref 34.5–45)
HGB BLD-MCNC: 11.9 G/DL — SIGNIFICANT CHANGE UP (ref 11.5–15.5)
MCHC RBC-ENTMCNC: 30.6 PG — SIGNIFICANT CHANGE UP (ref 27–34)
MCHC RBC-ENTMCNC: 33.8 GM/DL — SIGNIFICANT CHANGE UP (ref 32–36)
MCV RBC AUTO: 90.5 FL — SIGNIFICANT CHANGE UP (ref 80–100)
PLATELET # BLD AUTO: 174 K/UL — SIGNIFICANT CHANGE UP (ref 150–400)
RBC # BLD: 3.89 M/UL — SIGNIFICANT CHANGE UP (ref 3.8–5.2)
RBC # FLD: 13.5 % — SIGNIFICANT CHANGE UP (ref 10.3–14.5)
TSH SERPL-MCNC: 2.29 UU/ML — SIGNIFICANT CHANGE UP (ref 0.34–4.82)
WBC # BLD: 7.16 K/UL — SIGNIFICANT CHANGE UP (ref 3.8–10.5)
WBC # FLD AUTO: 7.16 K/UL — SIGNIFICANT CHANGE UP (ref 3.8–10.5)

## 2020-12-31 PROCEDURE — 99232 SBSQ HOSP IP/OBS MODERATE 35: CPT

## 2020-12-31 PROCEDURE — 93306 TTE W/DOPPLER COMPLETE: CPT | Mod: 26

## 2020-12-31 RX ORDER — ATORVASTATIN CALCIUM 80 MG/1
40 TABLET, FILM COATED ORAL AT BEDTIME
Refills: 0 | Status: DISCONTINUED | OUTPATIENT
Start: 2020-12-31 | End: 2021-01-01

## 2020-12-31 RX ADMIN — Medication 81 MILLIGRAM(S): at 08:56

## 2020-12-31 RX ADMIN — Medication 100 MILLIGRAM(S): at 21:21

## 2020-12-31 RX ADMIN — Medication 100 MILLIGRAM(S): at 08:56

## 2020-12-31 RX ADMIN — VALSARTAN 320 MILLIGRAM(S): 80 TABLET ORAL at 12:33

## 2020-12-31 RX ADMIN — Medication 25 MICROGRAM(S): at 05:01

## 2020-12-31 NOTE — CHART NOTE - NSCHARTNOTEFT_GEN_A_CORE
Presented for [facility]. Risks and benefits of procedure explained to pt;  informed consent signed  A+Ox4. Denies CP, SOB, palpitation  ASA:II  Bleeding  Risk score:3.5%  Creatinine:1.05  GFR:50

## 2020-12-31 NOTE — CHART NOTE - NSCHARTNOTEFT_GEN_A_CORE
s/p LHC.  Denies chest pain, shortness of breath, dizziness or palpitations at this time    A+Ox4  CV: S1S2 reg  Respiratory: CTAB  Right groin procedure site CDI.  no bleeding, no hematoma, site soft, non tender, positive pedal pulses bilaterally    T(C): 36.3 (31 Dec 2020 09:23), Max: 36.8 (30 Dec 2020 15:47)  T(F): 97.3 (31 Dec 2020 09:23), Max: 98.3 (30 Dec 2020 15:47)  HR: 58 (31 Dec 2020 11:41) (58 - 66)  BP: 158/67 (31 Dec 2020 11:41) (120/48 - 166/48)  BP(mean): 78 (31 Dec 2020 09:23) (64 - 78)  RR: 16 (31 Dec 2020 11:41) (16 - 18)  SpO2: 100% (31 Dec 2020 11:41) (95% - 100%)    HPI:  81 y/o female with PMHx of HTN, Alopecia totalis, Diverticulitis, Diverticulosis, Hypothyroidism presents to the ED c/o CP. Pt went to PMD last week and found to have low pulse. PMD decreased HTN medication, Inderal from 120 to 60 mg daily. Patient reports she woke up this morning feeling fine, then started to feel dizzy, noticed blurred vision, noted some left-sided CP, back pain. Used a BP machine and noted her BP was in the 200s. Called cardiologist, Dr. Moreno, who instructed her to come to ED. Family hHx of HTN and CAD. Denies smoking or Etoh. Reports adherence to medication regimen    In the ED, VS Temp 99.8 HR: 59 BP: 225/107à 182/69 R; 20 SpO2 100% on RA . Labs included CBC wnl. PTT/PT/INR wnl. BMP wnl. LFTS unremarkable. Trop I 0.020, 0.0602.  EKG per ED documentation was noted for no ST changes. Imaging included CXR, which was neg for acute cardiopulmonary abnormalities.  Given a total of Labetalol 40 mg IV and  mg and admitted to medicine for further management.  Patient is currently denies any headache lightheaded, blurred vision, chest pain, Sob, n/v, abdominal or back pain, numbness, tingling, fevers or chills.    (29 Dec 2020 21:07)    now s/p [facility]: crd< from: Cardiac Cath Lab - Adult (12.31.20 @ 10:21) >    Impression     Diagnostic Conclusions     Non obstructive coronary artery disease.   Normal LV systolic function. Estimated LV ejection fraction is 70 %.   Elevated left ventricular end-diastolic pressure.   No aortic valve stenosis.     Recommendations     Manage with medical therapy.   Aggressive medical management of coronary artery disease and its   underlying risk factors.   Optimize blood pressure control.    < end of copied text >      -encourage PO fluids  -plan of care discussed with patient,   -post procedure  instructions reviewed  -follow up with MD in 1-2 weeks after discharge  -Discussed therapeutic lifestyle changes to reduce risk factors such as following a cardiac diet, weight loss, maintaining a healthy weight, exercise, smoking cessation, medication compliance, and regular follow-up  with MD

## 2021-01-01 ENCOUNTER — TRANSCRIPTION ENCOUNTER (OUTPATIENT)
Age: 81
End: 2021-01-01

## 2021-01-01 VITALS
HEART RATE: 51 BPM | TEMPERATURE: 98 F | OXYGEN SATURATION: 100 % | RESPIRATION RATE: 19 BRPM | SYSTOLIC BLOOD PRESSURE: 133 MMHG | DIASTOLIC BLOOD PRESSURE: 57 MMHG

## 2021-01-01 LAB
ANION GAP SERPL CALC-SCNC: 5 MMOL/L — SIGNIFICANT CHANGE UP (ref 5–17)
BUN SERPL-MCNC: 38 MG/DL — HIGH (ref 7–23)
CALCIUM SERPL-MCNC: 9.3 MG/DL — SIGNIFICANT CHANGE UP (ref 8.5–10.1)
CHLORIDE SERPL-SCNC: 106 MMOL/L — SIGNIFICANT CHANGE UP (ref 96–108)
CO2 SERPL-SCNC: 25 MMOL/L — SIGNIFICANT CHANGE UP (ref 22–31)
CREAT SERPL-MCNC: 1.04 MG/DL — SIGNIFICANT CHANGE UP (ref 0.5–1.3)
GLUCOSE SERPL-MCNC: 102 MG/DL — HIGH (ref 70–99)
HCT VFR BLD CALC: 35.7 % — SIGNIFICANT CHANGE UP (ref 34.5–45)
HGB BLD-MCNC: 11.7 G/DL — SIGNIFICANT CHANGE UP (ref 11.5–15.5)
MCHC RBC-ENTMCNC: 30.2 PG — SIGNIFICANT CHANGE UP (ref 27–34)
MCHC RBC-ENTMCNC: 32.8 GM/DL — SIGNIFICANT CHANGE UP (ref 32–36)
MCV RBC AUTO: 92.2 FL — SIGNIFICANT CHANGE UP (ref 80–100)
PLATELET # BLD AUTO: 178 K/UL — SIGNIFICANT CHANGE UP (ref 150–400)
POTASSIUM SERPL-MCNC: 3.9 MMOL/L — SIGNIFICANT CHANGE UP (ref 3.5–5.3)
POTASSIUM SERPL-SCNC: 3.9 MMOL/L — SIGNIFICANT CHANGE UP (ref 3.5–5.3)
RBC # BLD: 3.87 M/UL — SIGNIFICANT CHANGE UP (ref 3.8–5.2)
RBC # FLD: 13.5 % — SIGNIFICANT CHANGE UP (ref 10.3–14.5)
SODIUM SERPL-SCNC: 136 MMOL/L — SIGNIFICANT CHANGE UP (ref 135–145)
WBC # BLD: 6.12 K/UL — SIGNIFICANT CHANGE UP (ref 3.8–10.5)
WBC # FLD AUTO: 6.12 K/UL — SIGNIFICANT CHANGE UP (ref 3.8–10.5)

## 2021-01-01 PROCEDURE — 99239 HOSP IP/OBS DSCHRG MGMT >30: CPT

## 2021-01-01 RX ORDER — ATORVASTATIN CALCIUM 80 MG/1
1 TABLET, FILM COATED ORAL
Qty: 30 | Refills: 0
Start: 2021-01-01 | End: 2021-01-30

## 2021-01-01 RX ORDER — LABETALOL HCL 100 MG
1 TABLET ORAL
Qty: 60 | Refills: 0
Start: 2021-01-01 | End: 2021-01-30

## 2021-01-01 RX ORDER — PROPRANOLOL HCL 160 MG
1 CAPSULE, EXTENDED RELEASE 24HR ORAL
Qty: 0 | Refills: 0 | DISCHARGE

## 2021-01-01 RX ORDER — HYDRALAZINE HCL 50 MG
0 TABLET ORAL
Qty: 0 | Refills: 0 | DISCHARGE

## 2021-01-01 RX ADMIN — Medication 100 MILLIGRAM(S): at 09:24

## 2021-01-01 RX ADMIN — Medication 81 MILLIGRAM(S): at 09:23

## 2021-01-01 RX ADMIN — Medication 25 MICROGRAM(S): at 06:13

## 2021-01-01 RX ADMIN — VALSARTAN 320 MILLIGRAM(S): 80 TABLET ORAL at 09:23

## 2021-01-01 NOTE — DISCHARGE NOTE PROVIDER - HOSPITAL COURSE
HPI: 81 y/o female with PMHx of HTN, Alopecia totalis, Diverticulitis, Diverticulosis, Hypothyroidism presents to the ED c/o CP. Pt went to PMD last week and found to have low pulse. PMD decreased HTN medication, Inderal from 120 to 60 mg daily. Patient reports she woke up this morning feeling fine, then started to feel dizzy, noticed blurred vision, noted some left-sided CP, back pain. Used a BP machine and noted her BP was in the 200s. Called cardiologist, Dr. Moreno, who instructed her to come to ED. Family hHx of HTN and CAD. Denies smoking or Etoh. Reports adherence to medication regimen    In the ED, VS Temp 99.8 HR: 59 BP: 225/107à 182/69 R; 20 SpO2 100% on RA . Labs included CBC wnl. PTT/PT/INR wnl. BMP wnl. LFTS unremarkable. Trop I 0.020, 0.0602.  EKG per ED documentation was noted for no ST changes. Imaging included CXR, which was neg for acute cardiopulmonary abnormalities.  Given a total of Labetalol 40 mg IV and  mg and admitted to medicine for further management.  She takes inderal , hydralzine,valsarten at home , currently Sx free    Patient is chest pain free. Hemodynamically stable. Denies any HA, CP, SOB. No fevers, chills or shakes. Labs and vitals.     Physical Exam:   GENERAL APPEARANCE:  NAD, hemodynamically stable  T(C): 36.7 (01-01-21 @ 08:26), Max: 36.7 (12-31-20 @ 15:54)  HR: 51 (01-01-21 @ 08:26) (51 - 64)  BP: 133/57 (01-01-21 @ 08:26) (125/51 - 166/58)  RR: 19 (01-01-21 @ 08:26) (16 - 19)  SpO2: 100% (01-01-21 @ 08:26) (95% - 100%)  HEENT:  Head is normocephalic    Skin:  Warm and dry without any rash   NECK:  Supple without lymphadenopathy.   HEART:  Regular rate and rhythm. normal S1 and S2, No M/R/G  LUNGS:  Good ins/exp effort, no W/R/R/C  ABDOMEN:  Soft, nontender, nondistended with good bowel sounds heard  EXTREMITIES:  Without cyanosis, clubbing or edema.   NEUROLOGICAL:  Gross nonfocal

## 2021-01-01 NOTE — DISCHARGE NOTE NURSING/CASE MANAGEMENT/SOCIAL WORK - PATIENT PORTAL LINK FT
You can access the FollowMyHealth Patient Portal offered by [facility] by registering at the following website: [email or web address]. By joining [employer]’s FollowMyHealth portal, you will also be able to view your health information using other applications (apps) compatible with our system.

## 2021-01-01 NOTE — PROGRESS NOTE ADULT - SUBJECTIVE AND OBJECTIVE BOX
Reason for Admission: Chest pain Hypertensive emergency  History of Present Illness:   79 y/o female with PMHx of HTN, Alopecia totalis, Diverticulitis, Diverticulosis, Hypothyroidism presents to the ED c/o CP. Pt went to PMD last week and found to have low pulse. PMD decreased HTN medication, Inderal from 120 to 60 mg daily. Patient reports she woke up this morning feeling fine, then started to feel dizzy, noticed blurred vision, noted some left-sided CP, back pain. Used a BP machine and noted her BP was in the 200s. Called cardiologist, Dr. Moreno, who instructed her to come to ED. Family hHx of HTN and CAD. Denies smoking or Etoh. Reports adherence to medication regimen    In the ED, VS Temp 99.8 HR: 59 BP: 225/107à 182/69 R; 20 SpO2 100% on RA . Labs included CBC wnl. PTT/PT/INR wnl. BMP wnl. LFTS unremarkable. Trop I 0.020, 0.0602.  EKG per ED documentation was noted for no ST changes. Imaging included CXR, which was neg for acute cardiopulmonary abnormalities.  Given a total of Labetalol 40 mg IV and  mg and admitted to medicine for further management.  Patient is currently denies any headache lightheaded, blurred vision, chest pain, Sob, n/v, abdominal or back pain, numbness, tingling, fevers or chills.   REVIEW OF SYSTEMS:  General: NAD, hemodynamically stable, (-)  fever, (-) chills, (-) weakness  HEENT:  Eyes:  No visual loss, blurred vision, double vision or yellow sclerae. Ears, Nose, Throat:  No hearing loss, sneezing, congestion, runny nose or sore throat.  SKIN:  No rash or itching.  CARDIOVASCULAR:  No chest pain, chest pressure or chest discomfort. No palpitations or edema.  RESPIRATORY:  No shortness of breath, cough or sputum.  GASTROINTESTINAL:  No anorexia, nausea, vomiting or diarrhea. No abdominal pain or blood.  NEUROLOGICAL:  No headache, dizziness, syncope, paralysis, ataxia, numbness or tingling in the extremities. No change in bowel or bladder control.  MUSCULOSKELETAL:  No muscle, back pain, joint pain or stiffness.  HEMATOLOGIC:  No anemia, bleeding or bruising.  LYMPHATICS:  No enlarged nodes. No history of splenectomy.  ENDOCRINOLOGIC:  No reports of sweating, cold or heat intolerance. No polyuria or polydipsia.  ALLERGIES:  No history of asthma, hives, eczema or rhinitis.      CBC Full  -  ( 30 Dec 2020 10:25 )  WBC Count : 7.24 K/uL  RBC Count : 4.17 M/uL  Hemoglobin : 12.6 g/dL  Hematocrit : 36.6 %  Platelet Count - Automated : 184 K/uL  Mean Cell Volume : 87.8 fl  Mean Cell Hemoglobin : 30.2 pg  Mean Cell Hemoglobin Concentration : 34.4 gm/dL  Auto Neutrophil # : x  Auto Lymphocyte # : x  Auto Monocyte # : x  Auto Eosinophil # : x  Auto Basophil # : x  Auto Neutrophil % : x  Auto Lymphocyte % : x  Auto Monocyte % : x  Auto Eosinophil % : x  Auto Basophil % : x    PT/INR - ( 29 Dec 2020 14:41 )   PT: 11.7 sec;   INR: 1.00 ratio         PTT - ( 30 Dec 2020 10:25 )  PTT:61.7 sec    12-30    138  |  107  |  24<H>  ----------------------------<  120<H>  3.8   |  28  |  1.05        Ca    10.1      30 Dec 2020 06:09  Mg     2.1     12-29    TPro  7.6  /  Alb  3.9  /  TBili  0.6  /  DBili  x   /  AST  32  /  ALT  30  /  AlkPhos  81  12-29    # Hypertensive emergency  - possibly due to recent BB titration  - given hydralazine and labetalol in the ED  - resume home meds  - get morning echo  - monitor bp    # Elevated Troponin  - monitor on tele  - elevated troponin -  was not able to do nuclear stress as patient   - endorsed intermittent chest pain  - get echo  - Cardiology follow up appreciated    # syncopal episode  - i think patient might have had orthostatic hypotension, as patient notes that she has prior history of positional dizziness  - CT scan not significant for stroke  - Pending EEG  
  Reason for Admission: Chest pain Hypertensive emergency  History of Present Illness:   81 y/o female with PMHx of HTN, Alopecia totalis, Diverticulitis, Diverticulosis, Hypothyroidism presents to the ED c/o CP. Pt went to PMD last week and found to have low pulse. PMD decreased HTN medication, Inderal from 120 to 60 mg daily. Patient reports she woke up this morning feeling fine, then started to feel dizzy, noticed blurred vision, noted some left-sided CP, back pain. Used a BP machine and noted her BP was in the 200s. Called cardiologist, Dr. Moreno, who instructed her to come to ED. Family hHx of HTN and CAD. Denies smoking or Etoh. Reports adherence to medication regimen    In the ED, VS Temp 99.8 HR: 59 BP: 225/107à 182/69 R; 20 SpO2 100% on RA . Labs included CBC wnl. PTT/PT/INR wnl. BMP wnl. LFTS unremarkable. Trop I 0.020, 0.0602.  EKG per ED documentation was noted for no ST changes. Imaging included CXR, which was neg for acute cardiopulmonary abnormalities.  Given a total of Labetalol 40 mg IV and  mg and admitted to medicine for further management.  Patient is currently denies any headache lightheaded, blurred vision, chest pain, Sob, n/v, abdominal or back pain, numbness, tingling, fevers or chills.   REVIEW OF SYSTEMS:  General: NAD, hemodynamically stable   HEENT:  Eyes:  No visual loss, blurred vision   SKIN:  No rash or itching.  CARDIOVASCULAR:  No chest pain, chest pressure or chest discomfort   RESPIRATORY:  No shortness of breath, cough or sputum.  GASTROINTESTINAL:  No anorexia, nausea, vomiting or diarrhea. No abdominal pain or blood.  NEUROLOGICAL:  No headache, dizziness, syncope, paralysis, ataxia, numbness or tingling in the extremities. No change in bowel or bladder control.  MUSCULOSKELETAL:  No muscle, back pain, joint pain or stiffness.  HEMATOLOGIC:  No anemia, bleeding or bruising.  LYMPHATICS:  No enlarged nodes. No history of splenectomy.  ENDOCRINOLOGIC:  No reports of sweating, cold or heat intolerance. No polyuria or polydipsia.  ALLERGIES:  No history of asthma, hives, eczema or rhinitis.      CBC Full  -  ( 31 Dec 2020 07:11 )  WBC Count : 7.16 K/uL  RBC Count : 3.89 M/uL  Hemoglobin : 11.9 g/dL  Hematocrit : 35.2 %  Platelet Count - Automated : 174 K/uL  Mean Cell Volume : 90.5 fl  Mean Cell Hemoglobin : 30.6 pg  Mean Cell Hemoglobin Concentration : 33.8 gm/dL  Auto Neutrophil # : x  Auto Lymphocyte # : x  Auto Monocyte # : x  Auto Eosinophil # : x  Auto Basophil # : x  Auto Neutrophil % : x  Auto Lymphocyte % : x  Auto Monocyte % : x  Auto Eosinophil % : x  Auto Basophil % : x    PT/INR - ( 29 Dec 2020 14:41 )   PT: 11.7 sec;   INR: 1.00 ratio         PTT - ( 30 Dec 2020 10:25 )  PTT:61.7 sec    12-30    138  |  107  |  24<H>  ----------------------------<  120<H>  3.8   |  28  |  1.05    Ca    10.1      30 Dec 2020 06:09  Mg     2.1     12-29    TPro  7.6  /  Alb  3.9  /  TBili  0.6  /  DBili  x   /  AST  32  /  ALT  30  /  AlkPhos  81  12-29          # Hypertensive emergency -  still poor BP control  - possibly due to recent BB titration  - PO valsartan /  Labetelol  - get morning echo  - monitor bp    # Elevated Troponin -  Cardiac cath today  - monitor on tele  - elevated troponin -  was not able to do nuclear stress as patient   - endorsed intermittent chest pain  - Non obstructive coronary artery disease.  Normal LV systolic function. Estimated LV ejection fraction is 70 %.  Elevated left ventricular end-diastolic pressure. No aortic valve stenosis.  - Cardiology follow up appreciated    # syncopal episode  - i think patient might have had orthostatic hypotension, as patient notes that she has prior history of positional dizziness  - CT scan not significant for stroke  
Pt s/p cardiac cath this morning.   Feels well, no more episodes of LOC/ seizure -like events    EEG done shows no evidence of seizures or epileptiform activity    ROS: As above, other ROS nEGATIVE    MEDICATIONS  (STANDING):  aspirin enteric coated 81 milliGRAM(s) Oral daily  atorvastatin 40 milliGRAM(s) Oral at bedtime  enoxaparin Injectable 40 milliGRAM(s) SubCutaneous daily  labetalol 100 milliGRAM(s) Oral every 12 hours  levothyroxine 25 MICROGram(s) Oral daily  valsartan 320 milliGRAM(s) Oral daily      Vital Signs Last 24 Hrs  T(C): 36.3 (31 Dec 2020 09:23), Max: 36.8 (30 Dec 2020 15:47)  T(F): 97.3 (31 Dec 2020 09:23), Max: 98.3 (30 Dec 2020 15:47)  HR: 58 (31 Dec 2020 11:41) (58 - 66)  BP: 158/67 (31 Dec 2020 11:41) (120/48 - 166/48)  BP(mean): 78 (31 Dec 2020 09:23) (64 - 78)  RR: 16 (31 Dec 2020 11:41) (16 - 18)  SpO2: 100% (31 Dec 2020 11:41) (95% - 100%)    Gen exam:  Normocephalic with Alopecia, awake and alert.  HEENT: PERRLA, EOMI,   Neck: Supple.    Neurological exam:  HF: A x O x 3. Appropriately interactive, normal affect. Speech fluent, No Aphasia or paraphasic errors. Naming /repetition intact   CN: PARAM, EOMI, VFF, facial sensation normal, no NLFD, tongue midline, Palate moves equally, SCM equal bilaterally  Motor: No pronator drift, Strength 5/5 in all 4 ext, normal bulk and tone, no tremor.    Sens: Intact to light touch / PP    Reflexes: Symmetric and normal . downgoing toes b/l  Coord:  No FNFA, dysmetria, SUMAN intact   Gait/Balance: Not tested    NIHSS: 0                        11.9   7.16  )-----------( 174      ( 31 Dec 2020 07:11 )             35.2     12-30    138  |  107  |  24<H>  ----------------------------<  120<H>  3.8   |  28  |  1.05    Ca    10.1      30 Dec 2020 06:09  Mg     2.1     12-29    TPro  7.6  /  Alb  3.9  /  TBili  0.6  /  DBili  x   /  AST  32  /  ALT  30  /  AlkPhos  81  12-29 12-30 Chol 217<H> LDL -- HDL 66 Trig 110    IMPRESSION: No acute intracranial hemorrhage or mass effect.  If clinical suspicion for acute infarct persists, brain MRI can be obtained if there is no contraindication.    These findings were discussed with Dr. Barcenas at 12/30/2020 10:24 AM by Dr. Duke Welsh with read back confirmation.    < from: EEG Awake or Drowsy (12.30.20 @ 12:00) >  EEG Summary/Classification:  This was a slightly abnormal EEG study in the awake and drowsy statesdue to the presence of mild generalized slowing.    EEG Impression/Clinical Correlate:  The findings are indicative of mild diffuse cerebral dysfunction. No epileptiform activity was seen and no clinical events or seizures were recorded. Consider a repeat study if clinically indicated.        
Cardiology Progress Note    HPI: 79 y/o female with PMHx of HTN, Alopecia totalis, Diverticulitis, Diverticulosis, Hypothyroidism presents to the ED c/o CP. Pt went to PMD last week and found to have low pulse. PMD decreased HTN medication, Inderal from 120 to 60 mg daily. Patient reports she woke up this morning feeling fine, then started to feel dizzy, noticed blurred vision, noted some left-sided CP, back pain. Used a BP machine and noted her BP was in the 200s. Called cardiologist, Dr. Moreno, who instructed her to come to ED. Family hHx of HTN and CAD. Denies smoking or Etoh. Reports adherence to medication regimen    In the ED, VS Temp 99.8 HR: 59 BP: 225/107à 182/69 R; 20 SpO2 100% on RA . Labs included CBC wnl. PTT/PT/INR wnl. BMP wnl. LFTS unremarkable. Trop I 0.020, 0.0602.  EKG per ED documentation was noted for no ST changes. Imaging included CXR, which was neg for acute cardiopulmonary abnormalities.  Given a total of Labetalol 40 mg IV and  mg and admitted to medicine for further management.  She takes inderal , hydralzine,valsarten at home , currently Sx free    PAST MEDICAL & SURGICAL HISTORY:  Alopecia (Capitis) Totalis  wears wig  History of Obesity  Diverticulitis of Large Intestine  on and off since . Tx with Abx and bowel rest on prior exacerbations  Diverticulosis of the Colon  H/O: Hypothyroidism  in remote past, no longer needs med  HTN (Hypertension)  Hemorrhoids  had surgery to correct   (Normal Spontaneous Vaginal Delivery)  x2          MEDICATIONS  (STANDING):  amLODIPine   Tablet 5 milliGRAM(s) Oral daily  aspirin enteric coated 81 milliGRAM(s) Oral daily  heparin  Infusion.  Unit(s)/Hr (10 mL/Hr) IV Continuous <Continuous>  hydrALAZINE 50 milliGRAM(s) Oral two times a day  hydrochlorothiazide Oral Tab/Cap - Peds 12.5 milliGRAM(s) Oral daily  levothyroxine 25 MICROGram(s) Oral daily  propranolol LA 60 milliGRAM(s) Oral daily  valsartan 320 milliGRAM(s) Oral daily    MEDICATIONS  (PRN):  heparin   Injectable 5300 Unit(s) IV Push every 6 hours PRN For aPTT less than 40    FAMILY HISTORY:  FH: HTN (hypertension)    SOCIAL HISTORY: Noncontributory    Vital Signs Last 24 Hrs  T(C): 37.1 (30 Dec 2020 05:35), Max: 37.7 (29 Dec 2020 14:14)  T(F): 98.8 (30 Dec 2020 05:35), Max: 99.8 (29 Dec 2020 14:14)  HR: 64 (30 Dec 2020 05:35) (59 - 68)  BP: 175/71 (30 Dec 2020 05:35) (169/55 - 225/107)  BP(mean): 105 (29 Dec 2020 17:18) (105 - 121)  RR: 18 (30 Dec 2020 05:35) (18 - 20)  SpO2: 97% (30 Dec 2020 05:35) (97% - 100%)      Gen exam:  Normocephalic with Alopecia, awake and alert.  HEENT: PERRLA, EOMI,   Neck: Supple.  Respiratory: Breath sounds are clear bilaterally  Cardiovascular: S1 and S2, regular / irregular rhythm  Extremities:  no edema  Vascular: Caritid Bruit - no  Musculoskeletal:  no abnormal movements  Skin: No rashes  Neurological exam: A x O x 3. Nl UE/LE strength.    INTERPRETATION OF TELEMETRY: SR    ECG: < from: 12 Lead ECG (20 @ 18:52) >  Sinus bradycardia  Left axis deviation  Abnormal ECG    I&O's Detail      LABS:                        13.5   7.57  )-----------( 196      ( 29 Dec 2020 14:41 )             39.5         136  |  104  |  31<H>  ----------------------------<  102<H>  4.2   |  26  |  1.06    Ca    9.8      29 Dec 2020 14:41  Mg     2.1         TPro  7.6  /  Alb  3.9  /  TBili  0.6  /  DBili  x   /  AST  32  /  ALT  30  /  AlkPhos  81  -    CARDIAC MARKERS ( 30 Dec 2020 01:19 )  0.940 ng/mL / x     / x     / x     / x      CARDIAC MARKERS ( 29 Dec 2020 22:44 )  0.838 ng/mL / x     / x     / x     / x      CARDIAC MARKERS ( 29 Dec 2020 18:13 )  0.602 ng/mL / x     / x     / x     / x      CARDIAC MARKERS ( 29 Dec 2020 14:41 )  0.020 ng/mL / x     / x     / x     / x        PT/INR - ( 29 Dec 2020 14:41 )   PT: 11.7 sec;   INR: 1.00 ratio       PTT - ( 29 Dec 2020 14:41 )  PTT:34.3 sec    I&O's Summary    BNPSerum Pro-Brain Natriuretic Peptide: 144 pg/mL ( @ 14:41)    RADIOLOGY & ADDITIONAL STUDIES: < from: NM Nuclear Stress Pharmacologic Multiple (20 @ 10:00) >  IMPRESSION: Incomplete SPECT/CT Myocardial Perfusion Imaging.  The stress portion of the imaging procedure could not be performed as the patient's clinical condition deteriorated.    `< from: Cardiac Cath Lab - Adult (20 @ 10:21) >  Angiographic Findings     Cardiac Arteries and Lesion Findings    LMCA: Normal.  LAD: Large caliber vessel. Proximal minor irregularities.  LCx: Normal.Large caliber vessel.  RCA: Normal.Large caliber vessel.  LV function assessed as:Normal.    
Patient is a 80y old  Female who presents with a chief complaint of Chest pain Hypertensive emergency (30 Dec 2020 12:24)    12/31- denies further cp , events of yeaterday noted , she's refusing stress test at this time    MEDICATIONS  (STANDING):  aspirin enteric coated 81 milliGRAM(s) Oral daily  enoxaparin Injectable 40 milliGRAM(s) SubCutaneous daily  labetalol 100 milliGRAM(s) Oral every 12 hours  levothyroxine 25 MICROGram(s) Oral daily  valsartan 320 milliGRAM(s) Oral daily    MEDICATIONS  (PRN):            Vital Signs Last 24 Hrs  T(C): 36.7 (30 Dec 2020 21:11), Max: 36.8 (30 Dec 2020 15:47)  T(F): 98.1 (30 Dec 2020 21:11), Max: 98.3 (30 Dec 2020 15:47)  HR: 66 (30 Dec 2020 21:52) (53 - 66)  BP: 123/44 (30 Dec 2020 21:52) (109/53 - 162/80)  BP(mean): 64 (30 Dec 2020 21:52) (64 - 64)  RR: 16 (30 Dec 2020 21:11) (16 - 16)  SpO2: 97% (30 Dec 2020 21:11) (97% - 99%)            INTERPRETATION OF TELEMETRY:    ECG:        LABS:                        11.9   7.16  )-----------( 174      ( 31 Dec 2020 07:11 )             35.2     12-30    138  |  107  |  24<H>  ----------------------------<  120<H>  3.8   |  28  |  1.05    Ca    10.1      30 Dec 2020 06:09  Mg     2.1     12-29    TPro  7.6  /  Alb  3.9  /  TBili  0.6  /  DBili  x   /  AST  32  /  ALT  30  /  AlkPhos  81  12-29    CARDIAC MARKERS ( 30 Dec 2020 06:09 )  0.811 ng/mL / x     / x     / x     / x      CARDIAC MARKERS ( 30 Dec 2020 01:19 )  0.940 ng/mL / x     / x     / x     / x      CARDIAC MARKERS ( 29 Dec 2020 22:44 )  0.838 ng/mL / x     / x     / x     / x      CARDIAC MARKERS ( 29 Dec 2020 18:13 )  0.602 ng/mL / x     / x     / x     / x      CARDIAC MARKERS ( 29 Dec 2020 14:41 )  0.020 ng/mL / x     / x     / x     / x          PT/INR - ( 29 Dec 2020 14:41 )   PT: 11.7 sec;   INR: 1.00 ratio         PTT - ( 30 Dec 2020 10:25 )  PTT:61.7 sec    I&O's Summary    BNP  RADIOLOGY & ADDITIONAL STUDIES:

## 2021-01-01 NOTE — PROGRESS NOTE ADULT - ASSESSMENT
81 y/o female with PMHx of HTN, Alopecia totalis, Diverticulitis, Hypothyroidism presents to the ED with c/o dizziness, blurred vision, noted some left-sided CP, back pain, noted to have uncontrolled HTN medication; BP: 225/107 - 182/69; Trop I 0.020, 0.0602. Pt went for Nuclear stress test this morning, was geiven Labetalol, Hydralizine and Valsartan before the test, At 9.36 hours today while pt was having Nuclear stress test she became unresponsive, Code stroke was called, she was noted to have eye rolling movements, urinary incontinnece, was unresponsive breifly, /120-161/75; hr 59,  came around instantly, is not aware of what happened, NIHSS - 0, stat CT head - was negative (seen in [facility] at about 8.30 by RN)    # Most likely a brief seizure; possibly triggered by stress - procedure; EEG done shows no evidence of seizures or epileptiform activity, c/w mild diffuse cerebral dysfunction    - Recommend Observation, if any further LOC / seizure like episodes - F/U with neuro as OP    # unlikely TIA or stroke, given Symptoms, not a Tpa candidate    Above D/W pt.    Call neuro if needed henceforth    
A/P: 81 y/o female with PMHx of HTN, Alopecia totalis, Diverticulitis, Diverticulosis, Hypothyroidism presents to the ED c/o CP.     1. LHC- nonbobstructive CAD, no PCI performed.  cont medical mgmt.     2. HTN. Stable this AM. Cont current regimen for now and follow.    3. Dyslipidemia. Cont statin.    4. DVT proph, D/C planning. Has f/u appt with Dr. Mariee for monday.  
81 y/o female with PMHx of HTN, Alopecia totalis, Diverticulitis, Diverticulosis, Hypothyroidism presents to the ED c/o CP. Pt went to PMD last week and found to have low pulse. PMD decreased HTN medication, Inderal from 120 to 60 mg daily. Patient reports she woke up this morning feeling fine, then started to feel dizzy, noticed blurred vision, noted some left-sided CP, back pain. Used a BP machine and noted her BP was in the 200s. Called cardiologist, Dr. Moreno, who instructed her to come to ED. Family hHx of HTN and CAD. Denies smoking or Etoh. Reports adherence to medication regimen    In the ED, VS Temp 99.8 HR: 59 BP: 225/107à 182/69 R; 20 SpO2 100% on RA . Labs included CBC wnl. PTT/PT/INR wnl. BMP wnl. LFTS unremarkable. Trop I 0.020, 0.0602.  EKG per ED documentation was noted for no ST changes. Imaging included CXR, which was neg for acute cardiopulmonary abnormalities.  Given a total of Labetalol 40 mg IV and  mg and admitted to medicine for further management.  Patient is currently denies any headache lightheaded, blurred vision, chest pain, Sob, n/v, abdominal or back pain, numbness, tingling, fevers or chills.    (29 Dec 2020 21:07)    She takes inderal , hydralzine,valsarten at home , currently Sx free  pos trop trending up likely from sig HTN but need to r/o active ischemia  1) episode of what sounds like vasovagl and vasodepressor syncope during stress yeterday , will order a cardiac cath today if negative can DC home as long as BP is under control  2) cont labetolol 100 mg BID and titrate up , cont vaslarten   echo shows preserved LV function with mild LVH and no sig valve pathology

## 2021-01-01 NOTE — DISCHARGE NOTE PROVIDER - NSDCMRMEDTOKEN_GEN_ALL_CORE_FT
aspirin 81 mg oral delayed release tablet: 1 tab(s) orally once a day  atorvastatin 40 mg oral tablet: 1 tab(s) orally once a day (at bedtime)  Calcium 600+D Plus Minerals oral tablet, chewable: 1 tab(s) orally once a day  Diovan 320 mg oral tablet: 1 tab(s) orally once a day  labetalol 100 mg oral tablet: 1 tab(s) orally every 12 hours  Synthroid 25 mcg (0.025 mg) oral tablet: 1 tab(s) orally once a day

## 2021-01-01 NOTE — DISCHARGE NOTE PROVIDER - NSDCCPCAREPLAN_GEN_ALL_CORE_FT
PRINCIPAL DISCHARGE DIAGNOSIS  Diagnosis: Hypertensive emergency  Assessment and Plan of Treatment: # Hypertensive emergency -  better control  - possibly due to recent BB titration  - PO valsartan /  Labetelol  - monitor bp      SECONDARY DISCHARGE DIAGNOSES  Diagnosis: CAD (coronary artery disease)  Assessment and Plan of Treatment:   # Elevated Troponin -  Cardiac cath today  - monitor on tele  - elevated troponin -  was not able to do nuclear stress as patient   - endorsed intermittent chest pain  - Non obstructive coronary artery disease.  Normal LV systolic function. Estimated LV ejection fraction is 70 %.  Elevated left ventricular end-diastolic pressure. No aortic valve stenosis.  - Cardiology follow up appreciated

## 2021-01-04 DIAGNOSIS — E66.9 OBESITY, UNSPECIFIED: ICD-10-CM

## 2021-01-04 DIAGNOSIS — I16.1 HYPERTENSIVE EMERGENCY: ICD-10-CM

## 2021-01-04 DIAGNOSIS — Z79.82 LONG TERM (CURRENT) USE OF ASPIRIN: ICD-10-CM

## 2021-01-04 DIAGNOSIS — Z88.5 ALLERGY STATUS TO NARCOTIC AGENT: ICD-10-CM

## 2021-01-04 DIAGNOSIS — N39.498 OTHER SPECIFIED URINARY INCONTINENCE: ICD-10-CM

## 2021-01-04 DIAGNOSIS — I10 ESSENTIAL (PRIMARY) HYPERTENSION: ICD-10-CM

## 2021-01-04 DIAGNOSIS — K57.30 DIVERTICULOSIS OF LARGE INTESTINE WITHOUT PERFORATION OR ABSCESS WITHOUT BLEEDING: ICD-10-CM

## 2021-01-04 DIAGNOSIS — R55 SYNCOPE AND COLLAPSE: ICD-10-CM

## 2021-01-04 DIAGNOSIS — Z88.0 ALLERGY STATUS TO PENICILLIN: ICD-10-CM

## 2021-01-04 DIAGNOSIS — Z88.1 ALLERGY STATUS TO OTHER ANTIBIOTIC AGENTS STATUS: ICD-10-CM

## 2021-01-04 DIAGNOSIS — R07.9 CHEST PAIN, UNSPECIFIED: ICD-10-CM

## 2021-01-04 DIAGNOSIS — G93.89 OTHER SPECIFIED DISORDERS OF BRAIN: ICD-10-CM

## 2021-01-04 DIAGNOSIS — E03.9 HYPOTHYROIDISM, UNSPECIFIED: ICD-10-CM

## 2021-01-04 DIAGNOSIS — I25.10 ATHEROSCLEROTIC HEART DISEASE OF NATIVE CORONARY ARTERY WITHOUT ANGINA PECTORIS: ICD-10-CM

## 2021-01-04 DIAGNOSIS — L63.0 ALOPECIA (CAPITIS) TOTALIS: ICD-10-CM

## 2021-08-03 ENCOUNTER — APPOINTMENT (OUTPATIENT)
Dept: OPHTHALMOLOGY | Facility: CLINIC | Age: 81
End: 2021-08-03
Payer: MEDICARE

## 2021-08-03 ENCOUNTER — NON-APPOINTMENT (OUTPATIENT)
Age: 81
End: 2021-08-03

## 2021-08-03 PROCEDURE — 92014 COMPRE OPH EXAM EST PT 1/>: CPT

## 2021-09-14 ENCOUNTER — APPOINTMENT (OUTPATIENT)
Dept: OBGYN | Facility: CLINIC | Age: 81
End: 2021-09-14
Payer: MEDICARE

## 2021-09-14 ENCOUNTER — NON-APPOINTMENT (OUTPATIENT)
Age: 81
End: 2021-09-14

## 2021-09-14 VITALS
DIASTOLIC BLOOD PRESSURE: 72 MMHG | SYSTOLIC BLOOD PRESSURE: 120 MMHG | WEIGHT: 185 LBS | HEIGHT: 59 IN | BODY MASS INDEX: 37.29 KG/M2

## 2021-09-14 DIAGNOSIS — Z86.010 PERSONAL HISTORY OF COLONIC POLYPS: ICD-10-CM

## 2021-09-14 DIAGNOSIS — R92.2 INCONCLUSIVE MAMMOGRAM: ICD-10-CM

## 2021-09-14 DIAGNOSIS — Z80.0 FAMILY HISTORY OF MALIGNANT NEOPLASM OF DIGESTIVE ORGANS: ICD-10-CM

## 2021-09-14 DIAGNOSIS — N39.0 URINARY TRACT INFECTION, SITE NOT SPECIFIED: ICD-10-CM

## 2021-09-14 DIAGNOSIS — Z01.419 ENCOUNTER FOR GYNECOLOGICAL EXAMINATION (GENERAL) (ROUTINE) W/OUT ABNORMAL FINDINGS: ICD-10-CM

## 2021-09-14 PROCEDURE — G0101: CPT

## 2021-09-14 RX ORDER — ROSUVASTATIN CALCIUM 5 MG/1
5 TABLET, FILM COATED ORAL
Refills: 0 | Status: ACTIVE | COMMUNITY

## 2021-09-14 RX ORDER — ESTRADIOL 10 UG/1
10 TABLET VAGINAL
Qty: 24 | Refills: 3 | Status: ACTIVE | COMMUNITY
Start: 2021-09-14 | End: 1900-01-01

## 2021-09-14 NOTE — PLAN
[FreeTextEntry1] : WILL PRICE OUT YUVAFEM VS. IMVEXXY.  [facility] [place] [address]  [phone].

## 2021-09-14 NOTE — PHYSICAL EXAM
[Appropriately responsive] : appropriately responsive [Alert] : alert [No Acute Distress] : no acute distress [No Murmurs] : no murmurs [Soft] : soft [Non-tender] : non-tender [Non-distended] : non-distended [No HSM] : No HSM [No Lesions] : no lesions [No Mass] : no mass [Oriented x3] : oriented x3 [Examination Of The Breasts] : a normal appearance [No Masses] : no breast masses were palpable [Vulvar Atrophy] : vulvar atrophy [Labia Majora] : normal [Labia Minora] : normal [Atrophy] : atrophy [Normal] : normal [Uterine Adnexae] : non-palpable [Exam Deferred] : was deferred [FreeTextEntry1] : OBLITERATED LABIA, SHINY, SMOOTH VULVA WITHOUT HYPERTROPHIC CHANGES OR ERYTHEMA [FreeTextEntry6] : LIMITED BY HABITUS [FreeTextEntry9] : COLONOSCOPY 5/2021

## 2021-09-14 NOTE — HISTORY OF PRESENT ILLNESS
[Patient reported mammogram was normal] : Patient reported mammogram was normal [Patient reported breast sonogram was normal] : Patient reported breast sonogram was normal [Patient reported PAP Smear was normal] : Patient reported PAP Smear was normal [Patient reported bone density results were abnormal] : Patient reported bone density results were abnormal [Patient reported colonoscopy was abnormal] : Patient reported colonoscopy was abnormal [No] : Patient does not have concerns regarding sex [Previously active] : previously active [FreeTextEntry1] : SAW DR. DELUNA LAST YEAR FOR RECURRENT UTI'S, SENT BY I.D.   WAS TREATED WITH ANTIBIOTICS, THEN PROPHYLACTIC VAGINAL ESTRADIOL SUPPOSITORIES, "IMVEXXY," HAS NOT HAD UTI SINCE.  \par \par WENT FOR COLONOSCOPY 2/2020, HAD 2 POLYPS, 1 DYSPLASTIC.  REPEAT 5/2021 WITH 1 BENIGN POLYP.  DR. BAIRD IN [facility].\par \par EXERCISING WITH "[employer]" EXERCISE STEPPER WHILE WATCHING TV.  HAS LOST 10 LBS.\par \par DISCUSSED PRECAUTIONS AGAINST COVID19, INCLUDING MASK WEARING, SOCIAL DISTANCING AND HAND WASHING.\par VACCINATED X 2, LAST 4/2021. [Mammogramdate] : 10/2020 [BreastSonogramDate] : 10/2020 [PapSmeardate] : 7/2016 [BoneDensityDate] : 9/2016 [ColonoscopyDate] : 5/2021 [TextBox_43] : POLYP

## 2021-09-17 ENCOUNTER — NON-APPOINTMENT (OUTPATIENT)
Age: 81
End: 2021-09-17

## 2021-09-20 RX ORDER — NITROFURANTOIN (MONOHYDRATE/MACROCRYSTALS) 25; 75 MG/1; MG/1
100 CAPSULE ORAL
Qty: 14 | Refills: 0 | Status: ACTIVE | COMMUNITY
Start: 2021-09-20 | End: 1900-01-01

## 2021-09-27 LAB
APPEARANCE: CLEAR
BACTERIA: NEGATIVE
BILIRUBIN URINE: NEGATIVE
BLOOD URINE: NEGATIVE
COLOR: YELLOW
CYTOLOGY CVX/VAG DOC THIN PREP: ABNORMAL
GLUCOSE QUALITATIVE U: NEGATIVE
HYALINE CASTS: 0 /LPF
KETONES URINE: NEGATIVE
LEUKOCYTE ESTERASE URINE: ABNORMAL
MICROSCOPIC-UA: NORMAL
NITRITE URINE: NEGATIVE
PH URINE: 7.5
PROTEIN URINE: NEGATIVE
RED BLOOD CELLS URINE: 1 /HPF
SPECIFIC GRAVITY URINE: 1.01
SQUAMOUS EPITHELIAL CELLS: 1 /HPF
UROBILINOGEN URINE: NORMAL
WHITE BLOOD CELLS URINE: 3 /HPF

## 2021-10-13 LAB
APPEARANCE: CLEAR
BACTERIA: NEGATIVE
BILIRUBIN URINE: NEGATIVE
BLOOD URINE: NEGATIVE
COLOR: NORMAL
GLUCOSE QUALITATIVE U: NEGATIVE
HYALINE CASTS: 0 /LPF
KETONES URINE: NEGATIVE
LEUKOCYTE ESTERASE URINE: NEGATIVE
MICROSCOPIC-UA: NORMAL
NITRITE URINE: NEGATIVE
PH URINE: 6
PROTEIN URINE: NEGATIVE
RED BLOOD CELLS URINE: 1 /HPF
SPECIFIC GRAVITY URINE: 1.01
SQUAMOUS EPITHELIAL CELLS: 1 /HPF
UROBILINOGEN URINE: NORMAL
WHITE BLOOD CELLS URINE: 4 /HPF

## 2021-10-18 LAB — BACTERIA UR CULT: NORMAL

## 2022-05-24 ENCOUNTER — NON-APPOINTMENT (OUTPATIENT)
Age: 82
End: 2022-05-24

## 2022-05-24 ENCOUNTER — APPOINTMENT (OUTPATIENT)
Dept: OPHTHALMOLOGY | Facility: CLINIC | Age: 82
End: 2022-05-24
Payer: MEDICARE

## 2022-05-24 PROCEDURE — 67820 REVISE EYELASHES: CPT | Mod: RT

## 2022-05-24 PROCEDURE — 92014 COMPRE OPH EXAM EST PT 1/>: CPT | Mod: 25

## 2022-06-23 ENCOUNTER — NON-APPOINTMENT (OUTPATIENT)
Age: 82
End: 2022-06-23

## 2022-09-23 ENCOUNTER — NON-APPOINTMENT (OUTPATIENT)
Age: 82
End: 2022-09-23

## 2022-09-23 DIAGNOSIS — R09.81 NASAL CONGESTION: ICD-10-CM

## 2022-09-23 DIAGNOSIS — R51.9 HEADACHE, UNSPECIFIED: ICD-10-CM

## 2022-09-23 RX ORDER — GUAIFENESIN 600 MG/1
TABLET, EXTENDED RELEASE ORAL
Refills: 0 | Status: ACTIVE | COMMUNITY

## 2022-09-23 RX ORDER — FLUTICASONE PROPIONATE 50 MCG
SPRAY, SUSPENSION NASAL
Refills: 0 | Status: ACTIVE | COMMUNITY

## 2022-09-28 ENCOUNTER — NON-APPOINTMENT (OUTPATIENT)
Age: 82
End: 2022-09-28

## 2022-10-04 ENCOUNTER — APPOINTMENT (OUTPATIENT)
Dept: PEDIATRIC ALLERGY IMMUNOLOGY | Facility: CLINIC | Age: 82
End: 2022-10-04

## 2022-10-04 PROCEDURE — 95117 IMMUNOTHERAPY INJECTIONS: CPT

## 2022-10-18 ENCOUNTER — APPOINTMENT (OUTPATIENT)
Dept: PEDIATRIC ALLERGY IMMUNOLOGY | Facility: CLINIC | Age: 82
End: 2022-10-18

## 2022-10-18 PROCEDURE — 95117 IMMUNOTHERAPY INJECTIONS: CPT

## 2022-11-01 ENCOUNTER — APPOINTMENT (OUTPATIENT)
Dept: PEDIATRIC ALLERGY IMMUNOLOGY | Facility: CLINIC | Age: 82
End: 2022-11-01

## 2022-11-01 PROCEDURE — 95117 IMMUNOTHERAPY INJECTIONS: CPT

## 2022-11-07 NOTE — PHYSICAL EXAM
[Awake] : awake [Alert] : alert [Soft] : soft [Obese] : obese [Oriented x3] : oriented to person, place, and time [Normal] : uterus [No Bleeding] : there was no active vaginal bleeding [Uterine Adnexae] : were not tender and not enlarged [Nl Sphincter Tone] : normal sphincter tone [External Hemorrhoid] : an external hemorrhoid [Acute Distress] : no acute distress [Mass] : no breast mass [Nipple Discharge] : no nipple discharge [Axillary LAD] : no axillary lymphadenopathy [Tender] : non tender [Occult Blood] : occult blood test from digital rectal exam was negative Xenograft Text: The defect edges were debeveled with a #15 scalpel blade.  Given the location of the defect, shape of the defect and the proximity to free margins a xenograft was deemed most appropriate.  The graft was then trimmed to fit the size of the defect.  The graft was then placed in the primary defect and oriented appropriately.

## 2022-11-09 ENCOUNTER — RX CHANGE (OUTPATIENT)
Age: 82
End: 2022-11-09

## 2022-11-15 ENCOUNTER — APPOINTMENT (OUTPATIENT)
Dept: PEDIATRIC ALLERGY IMMUNOLOGY | Facility: CLINIC | Age: 82
End: 2022-11-15

## 2022-11-15 PROCEDURE — 95117 IMMUNOTHERAPY INJECTIONS: CPT

## 2022-11-17 ENCOUNTER — RX RENEWAL (OUTPATIENT)
Age: 82
End: 2022-11-17

## 2022-11-17 RX ORDER — ESTRADIOL 10 UG/1
10 INSERT VAGINAL
Qty: 24 | Refills: 1 | Status: ACTIVE | COMMUNITY
Start: 2021-09-14 | End: 1900-01-01

## 2022-11-18 ENCOUNTER — INPATIENT (INPATIENT)
Facility: HOSPITAL | Age: 82
LOS: 1 days | Discharge: HOME | DRG: 305 | End: 2022-11-20
Attending: HOSPITALIST | Admitting: FAMILY MEDICINE
Payer: MEDICARE

## 2022-11-18 VITALS — WEIGHT: 184.97 LBS | HEIGHT: 59 IN

## 2022-11-18 DIAGNOSIS — I16.0 HYPERTENSIVE URGENCY: ICD-10-CM

## 2022-11-18 LAB
ADD ON TEST-SPECIMEN IN LAB: SIGNIFICANT CHANGE UP
ALBUMIN SERPL ELPH-MCNC: 4.5 G/DL — SIGNIFICANT CHANGE UP (ref 3.3–5)
ALP SERPL-CCNC: 71 U/L — SIGNIFICANT CHANGE UP (ref 40–120)
ALT FLD-CCNC: 28 U/L — SIGNIFICANT CHANGE UP (ref 12–78)
ANION GAP SERPL CALC-SCNC: 6 MMOL/L — SIGNIFICANT CHANGE UP (ref 5–17)
AST SERPL-CCNC: 26 U/L — SIGNIFICANT CHANGE UP (ref 15–37)
BASOPHILS # BLD AUTO: 0.04 K/UL — SIGNIFICANT CHANGE UP (ref 0–0.2)
BASOPHILS NFR BLD AUTO: 0.4 % — SIGNIFICANT CHANGE UP (ref 0–2)
BILIRUB SERPL-MCNC: 1.1 MG/DL — SIGNIFICANT CHANGE UP (ref 0.2–1.2)
BUN SERPL-MCNC: 27 MG/DL — HIGH (ref 7–23)
CALCIUM SERPL-MCNC: 10.4 MG/DL — HIGH (ref 8.5–10.1)
CHLORIDE SERPL-SCNC: 104 MMOL/L — SIGNIFICANT CHANGE UP (ref 96–108)
CO2 SERPL-SCNC: 29 MMOL/L — SIGNIFICANT CHANGE UP (ref 22–31)
CREAT SERPL-MCNC: 1.07 MG/DL — SIGNIFICANT CHANGE UP (ref 0.5–1.3)
EGFR: 52 ML/MIN/1.73M2 — LOW
EOSINOPHIL # BLD AUTO: 0.08 K/UL — SIGNIFICANT CHANGE UP (ref 0–0.5)
EOSINOPHIL NFR BLD AUTO: 0.8 % — SIGNIFICANT CHANGE UP (ref 0–6)
FLUAV AG NPH QL: SIGNIFICANT CHANGE UP
FLUBV AG NPH QL: SIGNIFICANT CHANGE UP
GLUCOSE SERPL-MCNC: 133 MG/DL — HIGH (ref 70–99)
HCT VFR BLD CALC: 42 % — SIGNIFICANT CHANGE UP (ref 34.5–45)
HGB BLD-MCNC: 14.9 G/DL — SIGNIFICANT CHANGE UP (ref 11.5–15.5)
IMM GRANULOCYTES NFR BLD AUTO: 0.3 % — SIGNIFICANT CHANGE UP (ref 0–0.9)
LYMPHOCYTES # BLD AUTO: 1.23 K/UL — SIGNIFICANT CHANGE UP (ref 1–3.3)
LYMPHOCYTES # BLD AUTO: 12.7 % — LOW (ref 13–44)
MCHC RBC-ENTMCNC: 31.7 PG — SIGNIFICANT CHANGE UP (ref 27–34)
MCHC RBC-ENTMCNC: 35.5 GM/DL — SIGNIFICANT CHANGE UP (ref 32–36)
MCV RBC AUTO: 89.4 FL — SIGNIFICANT CHANGE UP (ref 80–100)
MONOCYTES # BLD AUTO: 0.36 K/UL — SIGNIFICANT CHANGE UP (ref 0–0.9)
MONOCYTES NFR BLD AUTO: 3.7 % — SIGNIFICANT CHANGE UP (ref 2–14)
NEUTROPHILS # BLD AUTO: 7.91 K/UL — HIGH (ref 1.8–7.4)
NEUTROPHILS NFR BLD AUTO: 82.1 % — HIGH (ref 43–77)
PLATELET # BLD AUTO: 217 K/UL — SIGNIFICANT CHANGE UP (ref 150–400)
POTASSIUM SERPL-MCNC: 3.6 MMOL/L — SIGNIFICANT CHANGE UP (ref 3.5–5.3)
POTASSIUM SERPL-SCNC: 3.6 MMOL/L — SIGNIFICANT CHANGE UP (ref 3.5–5.3)
PROT SERPL-MCNC: 8.5 GM/DL — HIGH (ref 6–8.3)
RBC # BLD: 4.7 M/UL — SIGNIFICANT CHANGE UP (ref 3.8–5.2)
RBC # FLD: 13.2 % — SIGNIFICANT CHANGE UP (ref 10.3–14.5)
RSV RNA NPH QL NAA+NON-PROBE: SIGNIFICANT CHANGE UP
SARS-COV-2 RNA SPEC QL NAA+PROBE: SIGNIFICANT CHANGE UP
SODIUM SERPL-SCNC: 139 MMOL/L — SIGNIFICANT CHANGE UP (ref 135–145)
TROPONIN I, HIGH SENSITIVITY RESULT: 3.2 NG/L — SIGNIFICANT CHANGE UP
TROPONIN I, HIGH SENSITIVITY RESULT: <3 NG/L — SIGNIFICANT CHANGE UP
TSH SERPL-MCNC: 2.23 UU/ML — SIGNIFICANT CHANGE UP (ref 0.34–4.82)
WBC # BLD: 9.65 K/UL — SIGNIFICANT CHANGE UP (ref 3.8–10.5)
WBC # FLD AUTO: 9.65 K/UL — SIGNIFICANT CHANGE UP (ref 3.8–10.5)

## 2022-11-18 PROCEDURE — 99222 1ST HOSP IP/OBS MODERATE 55: CPT

## 2022-11-18 PROCEDURE — 93010 ELECTROCARDIOGRAM REPORT: CPT

## 2022-11-18 PROCEDURE — 84443 ASSAY THYROID STIM HORMONE: CPT

## 2022-11-18 PROCEDURE — 71045 X-RAY EXAM CHEST 1 VIEW: CPT | Mod: 26

## 2022-11-18 PROCEDURE — 71045 X-RAY EXAM CHEST 1 VIEW: CPT

## 2022-11-18 PROCEDURE — 84484 ASSAY OF TROPONIN QUANT: CPT

## 2022-11-18 PROCEDURE — 36415 COLL VENOUS BLD VENIPUNCTURE: CPT

## 2022-11-18 PROCEDURE — 99285 EMERGENCY DEPT VISIT HI MDM: CPT | Mod: FS

## 2022-11-18 RX ORDER — HYDRALAZINE HCL 50 MG
5 TABLET ORAL EVERY 8 HOURS
Refills: 0 | Status: DISCONTINUED | OUTPATIENT
Start: 2022-11-18 | End: 2022-11-20

## 2022-11-18 RX ORDER — ASPIRIN/CALCIUM CARB/MAGNESIUM 324 MG
1 TABLET ORAL
Qty: 0 | Refills: 0 | DISCHARGE

## 2022-11-18 RX ORDER — LEVOTHYROXINE SODIUM 125 MCG
1 TABLET ORAL
Qty: 0 | Refills: 0 | DISCHARGE

## 2022-11-18 RX ORDER — AMLODIPINE BESYLATE 2.5 MG/1
5 TABLET ORAL ONCE
Refills: 0 | Status: COMPLETED | OUTPATIENT
Start: 2022-11-18 | End: 2022-11-18

## 2022-11-18 RX ORDER — LEVOTHYROXINE SODIUM 125 MCG
25 TABLET ORAL DAILY
Refills: 0 | Status: DISCONTINUED | OUTPATIENT
Start: 2022-11-18 | End: 2022-11-20

## 2022-11-18 RX ORDER — ONDANSETRON 8 MG/1
4 TABLET, FILM COATED ORAL EVERY 8 HOURS
Refills: 0 | Status: DISCONTINUED | OUTPATIENT
Start: 2022-11-18 | End: 2022-11-20

## 2022-11-18 RX ORDER — HYDRALAZINE HCL 50 MG
10 TABLET ORAL ONCE
Refills: 0 | Status: COMPLETED | OUTPATIENT
Start: 2022-11-18 | End: 2022-11-18

## 2022-11-18 RX ORDER — ZINC SULFATE TAB 220 MG (50 MG ZINC EQUIVALENT) 220 (50 ZN) MG
1 TAB ORAL
Qty: 0 | Refills: 0 | DISCHARGE

## 2022-11-18 RX ORDER — PROPRANOLOL HCL 160 MG
120 CAPSULE, EXTENDED RELEASE 24HR ORAL DAILY
Refills: 0 | Status: DISCONTINUED | OUTPATIENT
Start: 2022-11-18 | End: 2022-11-20

## 2022-11-18 RX ORDER — VALSARTAN 80 MG/1
320 TABLET ORAL DAILY
Refills: 0 | Status: DISCONTINUED | OUTPATIENT
Start: 2022-11-18 | End: 2022-11-20

## 2022-11-18 RX ORDER — PROPRANOLOL HCL 160 MG
1 CAPSULE, EXTENDED RELEASE 24HR ORAL
Qty: 0 | Refills: 0 | DISCHARGE

## 2022-11-18 RX ORDER — HYDRALAZINE HCL 50 MG
50 TABLET ORAL THREE TIMES A DAY
Refills: 0 | Status: DISCONTINUED | OUTPATIENT
Start: 2022-11-18 | End: 2022-11-20

## 2022-11-18 RX ORDER — VALSARTAN 80 MG/1
1 TABLET ORAL
Qty: 0 | Refills: 0 | DISCHARGE

## 2022-11-18 RX ORDER — LANOLIN ALCOHOL/MO/W.PET/CERES
3 CREAM (GRAM) TOPICAL AT BEDTIME
Refills: 0 | Status: DISCONTINUED | OUTPATIENT
Start: 2022-11-18 | End: 2022-11-20

## 2022-11-18 RX ORDER — MULTIVIT-MIN/FERROUS GLUCONATE 9 MG/15 ML
1 LIQUID (ML) ORAL
Qty: 0 | Refills: 0 | DISCHARGE

## 2022-11-18 RX ORDER — CHOLECALCIFEROL (VITAMIN D3) 125 MCG
1 CAPSULE ORAL
Qty: 0 | Refills: 0 | DISCHARGE

## 2022-11-18 RX ORDER — UBIDECARENONE 100 MG
1 CAPSULE ORAL
Qty: 0 | Refills: 0 | DISCHARGE

## 2022-11-18 RX ORDER — ACETAMINOPHEN 500 MG
650 TABLET ORAL EVERY 6 HOURS
Refills: 0 | Status: DISCONTINUED | OUTPATIENT
Start: 2022-11-18 | End: 2022-11-20

## 2022-11-18 RX ORDER — ROSUVASTATIN CALCIUM 5 MG/1
5 TABLET ORAL AT BEDTIME
Refills: 0 | Status: DISCONTINUED | OUTPATIENT
Start: 2022-11-18 | End: 2022-11-20

## 2022-11-18 RX ORDER — ROSUVASTATIN CALCIUM 5 MG/1
1 TABLET ORAL
Qty: 0 | Refills: 0 | DISCHARGE

## 2022-11-18 RX ORDER — HYDROCHLOROTHIAZIDE 25 MG
1 TABLET ORAL
Qty: 0 | Refills: 0 | DISCHARGE

## 2022-11-18 RX ADMIN — Medication 650 MILLIGRAM(S): at 19:37

## 2022-11-18 RX ADMIN — ROSUVASTATIN CALCIUM 5 MILLIGRAM(S): 5 TABLET ORAL at 22:21

## 2022-11-18 RX ADMIN — Medication 50 MILLIGRAM(S): at 22:21

## 2022-11-18 RX ADMIN — Medication 120 MILLIGRAM(S): at 22:21

## 2022-11-18 RX ADMIN — AMLODIPINE BESYLATE 5 MILLIGRAM(S): 2.5 TABLET ORAL at 15:44

## 2022-11-18 RX ADMIN — Medication 10 MILLIGRAM(S): at 13:52

## 2022-11-18 NOTE — ED STATDOCS - CLINICAL SUMMARY MEDICAL DECISION MAKING FREE TEXT BOX
Hypertensive urgency, no signs of end organ damage.  Patient given hydralazine 10 mg IV with improvement.  Admit to medicine following discussion with patient's cardiologist.

## 2022-11-18 NOTE — ED STATDOCS - ATTENDING APP SHARED VISIT CONTRIBUTION OF CARE
[facility]OUN[facility]  FAMILY PRACTICE OFFICE VISIT    CHIEF COMPLAINT:    No chief complaint on file.      SUBJECTIVE:  Jenni Gates is a 23 year old female who presented requesting evaluation for ***.     REVIEW OF SYSTEMS:    All other systems are reviewed and are negative except as documented in the history of present illness.     PROBLEM LIST:    Patient Active Problem List   Diagnosis   • Nexplanon insertion   • Obesity (BMI 30.0-34.9)       MEDICATIONS:  No current outpatient medications on file prior to visit.  No current facility-administered medications on file prior to visit.       PAST HISTORIES:  I have reviewed the past medical history, family history, social history, medications and allergies listed in the medical record as obtained by my nursing staff and support staff and agree with their documentation..    OBJECTIVE:  PHYSICAL EXAM:    {PHYSICAL EXAM:9394592}    LAB RESULTS:  {LAB RESULTS - SUPER LIST:5574953}    ASSESSMENT AND PLAN:   There are no diagnoses linked to this encounter.     Health Maintenance Due   Topic Date Due   • Influenza Vaccine (1) 09/01/2019   • Depression Screening  03/14/2020      No follow-ups on file.   {INFORM OF RESULTS:2027391::\"No labs or tests ordered.\"}    This patient was discussed with  ***.    Luz Marina Morrow, DO       I performed the initial face to face bedside interview with this patient regarding history of present illness, review of symptoms and past medical, social and family history.  I completed an independent physical examination.  I was the initial provider who evaluated this patient.  The history, review of symptoms and examination was documented by the PA in my presence and I attest to the accuracy of the documentation.  I have signed out the follow up of any pending tests (i.e. labs, radiological studies) to the PA.  I have discussed the patient’s plan of care and disposition with the PA.

## 2022-11-18 NOTE — ED STATDOCS - OBJECTIVE STATEMENT
83 y/o female w/ a PMHx of HTN, alopecia totalis, diverticulitis, diverticulosis, and hypothyroidism presents to the ED c/o HTN. Pt reports she saw her internist Dr. Moreno yesterday she was found to have a BP of 170/90. Pt thinks her hypertensive med dosages need to be adjusted. Pt c/o mild HA. Denies CP, SOB, HA, or dizziness. No other complaints at this time. Pt took her BP meds this morning. Internist: Dr. Moreno

## 2022-11-18 NOTE — PATIENT PROFILE ADULT - FALL HARM RISK - UNIVERSAL INTERVENTIONS
Bed in lowest position, wheels locked, appropriate side rails in place/Call bell, personal items and telephone in reach/Instruct patient to call for assistance before getting out of bed or chair/Non-slip footwear when patient is out of bed/[place] to call system/Physically safe environment - no spills, clutter or unnecessary equipment/Purposeful Proactive Rounding/Room/bathroom lighting operational, light cord in reach

## 2022-11-18 NOTE — H&P ADULT - ASSESSMENT
83 y/o F PMHx significant for Hypertension, alopecia totalis, diverticulitis, diverticulosis, and hypothyroidism presents to the [facility] for further evaluation and management of uncontrolled hypertension and concern for hypertensive urgency. Of note the patient was evaluated yesterday by her PCP and was reportedly found to have a BP of 170/90. The patient additionally has c/o an associated headache. The patient denies any associated chest pain, shortness of breath, visual disturbances weakness, or paresthesias. The patient noted that her blood pressure was uncontrolled this morning prompting her visit to the [facility] today.  Vital signs in triage => /69, HR 52/min, RR 20/min, TMax 98.1'F, SpO2 96% on room air. Labs => TnI (-) x 1, BUN/Cr 27/1.07, Glu 133, In the ED the patient was given Amlodipine 5mg PO x 1, and Hydralazine 10mg IVP x 1.     #Hypertensive Urgency  ~admit to Telemetry  ~serial Kay/EKGs  ~f/u CT Head  ~cont. Hydralazine 5mg IVP q6h prn for SBP > 160mmHg  ~cont. Hydralazine 50mg po bid  ~cont. Amlodipine 5mg po daily  ~will cont. Diovan 320mg po daily  ~takes Propranolol ER 120mg po daily  ~takes HCTZ 12.5mg po daily    #Hyperlipidemia  ~cont. statin therapy with Rosuvastatin 5mg po qhs     #Hypothyroidism  ~f/u TFTs   ~cont. Levothyroxine 25mg po qam    #Vte ppx  ~will cont. SCDs for now pending CT Head    83 y/o F PMHx significant for Hypertension, alopecia totalis, diverticulitis, diverticulosis, and hypothyroidism presents to the [facility] for further evaluation and management of uncontrolled hypertension and concern for hypertensive urgency. Of note the patient was evaluated yesterday by her PCP and was reportedly found to have a BP of 170/90. The patient additionally has c/o an associated headache. The patient denies any associated chest pain, shortness of breath, visual disturbances weakness, or paresthesias. The patient noted that her blood pressure was uncontrolled this morning prompting her visit to the [facility] today.  Vital signs in triage => /69, HR 52/min, RR 20/min, TMax 98.1'F, SpO2 96% on room air. Labs => TnI (-) x 1, BUN/Cr 27/1.07, Glu 133, In the ED the patient was given Amlodipine 5mg PO x 1, and Hydralazine 10mg IVP x 1.     #Hypertensive Urgency  ~admit to Telemetry  ~serial Kay/EKGs  ~f/u CT Head  ~cont. Hydralazine 5mg IVP q8h prn for SBP > 170mmHg  ~will increase Hydralazine to 50mg po tid  ~cont. Amlodipine 5mg po daily  ~will cont. Diovan 320mg po daily  ~takes Propranolol ER 120mg po daily  ~takes HCTZ 12.5mg po daily  ~f/u w/ Cardiology in the am    #Hyperlipidemia  ~cont. statin therapy with Rosuvastatin 5mg po qhs     #Hypothyroidism  ~f/u TFTs   ~cont. Levothyroxine 25mg po qam    #Vte ppx  ~will cont. SCDs for now pending CT Head

## 2022-11-18 NOTE — ED ADULT NURSE REASSESSMENT NOTE - NS ED NURSE REASSESS COMMENT FT1
Pt refusing the CT, MD Ramírez aware, no intervention ordered at this time. Floor RN made aware.
Resumed care from FORD Nicole. Patient alert and oriented stating "I feel a little better." Patient with high blood pressure, medications administered.
Report received from [facility]. Pt resting comfortably in bed, c/o headache, tylenol given as ordered at this time, vital signs stable, awaiting bed, will reassess.

## 2022-11-18 NOTE — H&P ADULT - HISTORY OF PRESENT ILLNESS
83 y/o F PMHx significant for Hypertension, alopecia totalis, diverticulitis, diverticulosis, and hypothyroidism presents to the [facility] for further evaluation and management of uncontrolled hypertension and concern for hypertensive urgency. Of note the patient was evaluated yesterday by her PCP and was reportedly found to have a BP of 170/90. The patient additionally has c/o an associated headache. The patient denies any associated chest pain, shortness of breath, visual disturbances weakness, or paresthesias. The patient noted that her blood pressure was uncontrolled this morning prompting her visit to the [facility] today.  Vital signs in triage => /69, HR 52/min, RR 20/min, TMax 98.1'F, SpO2 96% on room air. Labs => TnI (-) x 1, BUN/Cr 27/1.07, Glu 133, In the ED the patient was given Amlodipine 5mg PO x 1, and Hydralazine 10mg IVP x 1.

## 2022-11-18 NOTE — ED ADULT NURSE NOTE - OBJECTIVE STATEMENT
pt presents to the ED with hypertension - stating she normally runs high but not as high as it was today and that the last time it was this high she was admitted to the hospital. pt does have a history of hypertension and takes medications to regulate her blood pressure everyday but reports being systolic above 200 today when she checked at home so she called a family member to bring her to the ED. pt denies SOB, chest pain, and LOC. labs obtained and sent to the lab - awaiting results.

## 2022-11-18 NOTE — ED STATDOCS - PROGRESS NOTE DETAILS
83 yo female with a PMH of allergies, htn, hld presents with elevated BP. Pt was seen by her PMD yesterday and had a BP in the 170s and was told to watch the BP and take it at hoe. Pt took it this morning and saw it was elevated and came to the ER. Pt states she was worried about It being elevated. Denies cp, sob, abd pain, urinary complaints.   PMD= JOSIE ortega Spoke with Dr. Moreno. States she would like the pt to be admitted, given 10mg IV hydralazine. -Uriel Duke PA-C Discussed admission with Dr. Curran. Benita nix pt to get 5mg of amlodipine now as well. -Uriel Duke PA-C

## 2022-11-18 NOTE — ED STATDOCS - NS ED ATTENDING STATEMENT MOD
This was a shared visit with the LEN. I reviewed and verified the documentation and independently performed the documented:

## 2022-11-18 NOTE — ED ADULT TRIAGE NOTE - CHIEF COMPLAINT QUOTE
Pt c/o HTN.  PMH of HTN, pt believes that her dosages need to be adjusted.  Pt endorses slight lightheadedness, but believes that is from "her nerves".  PT denies dizziness.

## 2022-11-18 NOTE — ED ADULT NURSE NOTE - NSIMPLEMENTINTERV_GEN_ALL_ED
Implemented All Universal Safety Interventions:  [place] to call system. Call bell, personal items and telephone within reach. Instruct patient to call for assistance. Room bathroom lighting operational. Non-slip footwear when patient is off stretcher. Physically safe environment: no spills, clutter or unnecessary equipment. Stretcher in lowest position, wheels locked, appropriate side rails in place.

## 2022-11-18 NOTE — PHARMACOTHERAPY INTERVENTION NOTE - COMMENTS
Patient:   SHANNAN JOHNSON            MRN: [id number]            FIN: [id number]              Age:   66 years     Sex:  MALE     :  55   Associated Diagnoses:   None   Author:   NEISHA YATES     Referral to Advanced HF: HCCI: Dr. Juarez Walker > Dr. Iverson  Hospitalist: COURTNEY Millan  Pharmacy: [address] and [facility]          Chief Complaint   Today's chief complaint: \"Everything is going good\"   Patient seen and examined for routine follow up  appointment. Patient states he has been feeling well, reports good UOP and weight trending up. Patient states weight inc likely from \"not eating properly.\" Reports occassional constipation. Reports was recently on abx for sinus infection. Denies any other complaints at this time. He denies ha, lightheadedness or dizziness, changes in vision, ,hemoptysis, SOB, chest pain or heart  palpitations, abodminal pain or bloating, n/v/d, urinary symptoms, dark urine, melena or hematochezia, numbness or tingling, fever or chills, or VAD alarms.      Admission Information   19: Epistaxis. D/c home     Review of Systems   Constitutional:  No fever, No chills, No sweats, No weakness, No fatigue, No decreased activity.   Eye:  No recent visual problem.    Ear/Nose/Mouth/Throat:  Epistaxis earlier today, resolved.    Respiratory:  No shortness of breath, No cough.    Cardiovascular:  Peripheral edema, No calf pain, No chest pain, No palpitations, No syncope.   Gastrointestinal:  No nausea, No vomiting, No diarrhea, No constipation, No bloating, No abdominal pain, No melena.   Genitourinary:  Urine color:  \"Yellow\", No hematuria.    Integumentary:  Negative, sternal incision CDI, driveline CDI. Dry kit twice weekly. .   Neurologic:  Alert and oriented X4, No numbness, No tingling, No headache.   Psychiatric:  Negative.      Histories   Past Medical History: A-fib  Acute CHF  Anxiety  Chronic pain  Diabetes  H/O inguinal hernia  H/O: blood  Medication history complete, reviewed medications with patient and confirmed with doctor first med hx. transfusion  HTN (hypertension)  High cholesterol  Risk factors for obstructive sleep apnea  Stroke        Physical Examination   Vitals between:   23-FEB-2020 14:32:16   TO   24-FEB-2020 14:32:16                   LAST RESULT MINIMUM MAXIMUM  Temperature 36.9 36.9 36.9  Heart Rate 69 69 69  Respiratory Rate 18 18 18  NISBP           108 108 108  NIDBP           70 70 70  NIMBP           83 83 83  SpO2                    97 97 97      VS/Measurements:  Vital Signs   02/24/20 11:43 CST Temperature - VS 36.9 deg_C  Normal    Temperature Source - VS Oral    Heart/Pulse Rate 69  Normal    Pulse Source Monitor    Respiration Rate 18 breaths/min  HI    SpO2 97 %  Normal    NIBP Systolic 108  Normal    NIBP Diastolic 70  Normal    NIBP Source Left Arm    NIBP MAP 83  Normal    NIBP MAP Manual Entry 96/D     , Measurements from flowsheet : Height and Weight   02/24/20 11:43 CST CLINICALWEIGHT 91.9 kg    Weight Method Measured    Weight Scale Standing     , Hemodynamics from flowsheet : Results   02/24/20 11:43 CST Left Centrifuge Mode Fixed    Left Centrifuge Flow 3.5    Left Centrifuge RPM 2,760    Left Centrifuge Motor Power Turpin 4.1     .    VAD Interrogation  RPM: 2760  Waveform: 4 L pulsatility, no negative deflection or signs of suction  LFA: 2, HPA: 6  Hct: 36%  Last LFA 11/29/19- asymptomatic  No changes made      General:  Alert and oriented, No acute distress.    Functional Status:  Patient can ambulate   6 minutes, no symptoms- no further interventions needed prior to discharge.    .    Neck:  No jugular venous distention.    Respiratory:  Lungs are clear to auscultation, Respirations are non-labored, Breath sounds are equal, Symmetrical chest wall expansion.   Cardiovascular:  Normal rate, Normal peripheral perfusion, No edema, no palp pulse. + VAD tones, NO JVD noted, repeat /70 MAP 82.   Gastrointestinal:  Soft, Non-tender, Non-distended, Normal bowel sounds.   Musculoskeletal     Normal range of  motion.     Normal strength.     Normal gait.     Integumentary:  Warm, Dry, DL dressing CDI. Dry kit. Pt approved for shower protocol.   Neurologic:  Alert, Oriented, No focal deficits.    Psychiatric:  Cooperative, Appropriate mood & affect, Normal judgment, Non-suicidal.      Health Status   Allergies:    Allergies (1) Active Reaction  amoxicillin None Documented    Medications:    Home Medications (16) Active  acetaminophen oral 325 mg tablet (Tylenol) 650 mg = 2 tab, PRN, Oral, Q4H  Afrin Nasal 0.05% spray 2 spray, PRN, IntraNasal, BID  AMIODArone oral 200 mg tablet (Cordarone) 200 mg = 1 tab, Oral, Daily  aspirin oral 325 mg DR tablet 325 mg = 1 tab, Oral, Daily  clindamycin 300 mg oral capsule 600 mg = 2 cap, Oral, Once (scheduled)  Crestor 10 mg oral tablet 10 mg = 1 tab, Oral, Daily  digoxin oral 125 mcg (0.125 mg) tablet 125 mcg = 1 tab, Oral, Daily  hydrALAZINE oral 50 mg tablet (Apresoline) 50 mg = 1 tab, Oral, Q8H  Invokana 300 mg oral tablet 300 mg = 1 tab, Oral, Daily  levothyroxine 100 mcg (0.1 mg) oral capsule 100 mcg = 1 cap, Oral, Daily  MiraLax oral powder for solution 17 gm = 17 gm, PRN, Oral, Daily  PARoxetine oral 20 mg tablet 20 mg = 1 tab, Oral, Daily [with breakfast]  potassium CHLORIDE oral 20 mEq ER tablet (KCl / K-Dur) 20 mEq = 1 tab, Oral, Daily  sildenafil oral 20 mg tablet 20 mg = 1 tab, Oral, Q8H  torsemide oral 10 mg tablet (Demadex) 20 mg = 2 tab, Oral, Daily  warfarin 5 mg oral tablet 5 mg = 1 tab, Oral, Q Evening      Review / Management    ECHO 7/11/19  INDICATIONS:   Nose bleed.  Left ventricular assist device.  -------------------------------------------------------------------  STUDY CONCLUSIONS  SUMMARY:  1. Left ventricle: The cavity size is normal. Wall thickness is     increased. There is concentric hypertrophy. Systolic function is    severely reduced. The estimated ejection fraction is 15-20%. A     left ventricular assist device (LVAD) is visualized, with an     inflow  cannula at the apex.  2. Left atrium: The atrium is dilated.  3. Right ventricle: Pacemaker lead noted in right ventricle.  4. Right atrium: Pacemaker lead noted in right atrium.  -------------------------------------------------------------------  STUDY DATA:   Procedure:  Transthoracic echocardiography was  performed. Image quality was good.  M-mode, complete 2D, complete  spectral Doppler, and color Doppler.  Study status:  STAT.  Study  completion:  There were no complications.  FINDINGS  LEFT VENTRICLE:  LVAD RPM 2760. The cavity size is normal. Wall  thickness is increased. There is concentric hypertrophy. Systolic  function is severely reduced. The estimated ejection fraction is  15-20%. There is no dynamic obstruction. A left ventricular assist  device (LVAD) is visualized, with an inflow cannula at the apex.  AORTIC VALVE:   Structurally normal valve.    Doppler:  Transvalvular velocity is within the normal range. There is no  stenosis.  No regurgitation. In correlation with the LVAD, the  leaflets open intermittently.  AORTA:  The aorta is normal.  MITRAL VALVE:  The annulus is normal-sized. Structurally normal  valve. The leaflets are normal thickness.  Doppler:  Transvalvular  velocity is within the normal range. There is no evidence for  stenosis.  Trivial regurgitation.  LEFT ATRIUM:  The atrium is dilated.  RIGHT VENTRICLE:  Pacemaker lead noted in right ventricle. The  cavity size is dilated. Systolic function is normal.  PULMONIC VALVE:   The annulus is normal-sized. Structurally normal  valve. The leaflets are normal thickness.  Doppler:  Transvalvular  velocity is within the normal range. There is no evidence for  stenosis.  No regurgitation.  TRICUSPID VALVE:  The annulus is normal-sized. Structurally normal  valve. The leaflets are normal thickness.  Doppler:  Transvalvular  velocity is within the normal range. There is no evidence for  stenosis.  Trivial regurgitation.  RIGHT ATRIUM:  Pacemaker  lead noted in right atrium. The atrium is  dilated.  PERICARDIUM:  There is no pericardial effusion.  -------------------------------------------------------------------  Measurements   Left ventricle                   Value        05/01/2019 Reference  LV ID, ED, PLAX chordal  (N)     4.8   cm     5.2        4.2 - 5.8  LV ID, ES, PLAX chordal  (H)     4.3   cm     4.4        2.5 - 4.0  LV mid-wall fx           (L)     5     %      8          14 - 22   shortening, PLAX chordal   LV PW thickness, ED,     (H)     1.1   cm     0.8        0.6 - 1.0  PLAX   IVS/LV PW ratio, ED,             1.00         1.12       ---------  PLAX   LV ID, major axis, ED,           7.6   cm     7.7        ---------  A4C   LV ID, major axis, ES,           7.3   cm     6.9        ---------  A4C   LV area, ED, A4C                 30    cm\S\2   34         ---------  LV area, ES, A4C                 26    cm\S\2   30         ---------  LV fx area change, A4C           14    %      13         ---------  LV PW thickness, ED      (H)     1.1   cm     0.8        0.6 - 1.0  IVS/LV PW ratio, ED              1            1.12       ---------  LV end-diastolic volume  (N)     111   ml     137        62 - 150  LV end-systolic volume   (H)     81    ml     88         21 - 61   Stroke volume, 2D                24    ml     37         ---------  LV end-diastolic         (N)     56    ml/m\S\2 69         34 - 74  volume/bsa   LV end-systolic          (H)     41    ml/m\S\2 44         11 - 31  volume/bsa   Stroke volume/bsa, 2D            12    ml/m\S\2 19         ---------  Stroke volume, 1-p A4C           19    ml     18         ---------  Stroke volume/bsa, 1-p           10    ml/m\S\2 9          ---------  A4C   LV end-systolic volume,  (H)     76    ml     109        21 - 61   2-p   LV end-systolic          (H)     38    ml/m\S\2 55         11 - 31  volume/bsa, 2-p   LV e', lateral                   0.08  m/sec  ---------- ---------  LV E/e', lateral                  7            ---------- ---------  LV e', medial                    0.083 m/sec  ---------- ---------  LV E/e', medial                  7            ---------- ---------  LV e', average                   0.081 m/sec  ---------- ---------  LV E/e', average                 7            ---------- ---------  Ventricular septum               Value        05/01/2019 Reference  IVS thickness, ED, PLAX  (H)     1.1   cm     0.9        0.6 - 1.0  IVS thickness, ED        (H)     1.1   cm     0.9        0.6 - 1.0  LVOT                             Value        05/01/2019 Reference  LVOT area                        3     cm\S\2   5          ---------  Left atrium                      Value        05/01/2019 Reference  LA ID, A-P, ES           (H)     4.4   cm     4.4        3.0 - 4.0  LA ID/bsa, A-P           (N)     2.2   cm/m\S\2 2.2        1.5 - 2.3  LA area, ES, A4C         (N)     12    cm\S\2   21         <=20   LA volume, ES, 1-p A4C   (N)     26    ml     58         18 - 58   LA volume/bsa, ES, 1-p   (N)     13    ml/m\S\2 29         12 - 37  A4C   Mitral valve                     Value        05/01/2019 Reference  Mitral E-wave peak               0.58  m/sec  ---------- ---------  velocity   Mitral A-wave peak               0.37  m/sec  ---------- ---------  velocity   Mitral deceleration time         95    ms     ---------- ---------  Mitral E/A ratio, peak           1.6          ---------- ---------  Tricuspid valve                  Value        05/01/2019 Reference  Tricuspid regurg peak            2.1   m/sec  2.3        ---------  velocity   Tricuspid peak RV-RA             17    mm Hg  22         ---------  gradient   Tricuspid maximal regurg         2.06  m/sec  2.32       ---------  velocity, PISA   Right ventricle                  Value        05/01/2019 Reference  RV ID, ED, PLAX                  3.6   cm     4.3        ---------     Diagnostic Findings:    Labs between:  23-FEB-2020 14:32 to  24-FEB-2020 14:32  CBC:                 WBC  HgB  Hct  Plt  MCV  RDW   24-FEB-2020 9.3  (L) 10.6  (L) 36.7  239  (L) 76.9  (H) 18.1   DIFF:                 Seg  Neutroph//ABS  Lymph//ABS  Mono//ABS  EOS/ABS  24-FEB-2020 NOT APPLICABLE  72 // 6.7 12 // 1.1 12 // (H) 1.1  3 // 0.3  BMP:                 Na  Cl  BUN  Glu   24-FEB-2020 137  106  11  (H) 120                              K  CO2  Cr  Ca                              3.7  24  (H) 1.24  8.8   CMP:                 AST  ALT  AlkPhos  Bili  Albumin   24-FEB-2020 (H) 39  49  66  0.6  3.7   Other Chem:             Mg  Phos  Triglycerides  GGTP  DirectBili                           2.3           COAG:                 INR  PT  PTT  Ddimer  Fibrinogen    24-FEB-2020 3.9  (H) 37.6                         .    LDH: 250     Impression and Plan   Acute on chronic ICM systolic and diastolic CHF, s/p HVAD 4/18/19, DT d/t cirrhosis  -NYHA Class 2  at 2760 RPM. NO alarms  -Echo 2/24/20: AV opens, mild AI, IVC large but colapses, reviewed by Dr. Toledo  -Echo 11/7/19: AV opens, trivial AI, unknown MR d/t artifact, RV dysfunction, LVEDD 5.5. Reviewed by Dr. Webster  -Echo 5/1/19: AV opens IM, Triv AI, Mild MR, Mild TR, RV systolic dysfunction, LV 5.2 RV 4.3  - GDMT:              -  Diuretic: Was previously on spironolactone 25 mg daily, stopped d/t gynecomastia. Sl hypervolemic--> inc torsemide 20mg Daily  -  Inotrope: Mil DC'd 4/29/19   -  AA: Amio 200mg daily   -  ACEI/ARB: None at this time   -  BB: None at this time   -  Other: digoxin 125mcg daily and Hydralazine 50mg TID   -  AC: Coumadin and ASA 325mg daily. Goal 2-3. INR checks at coumadin clinic. Managed by VAD Team. See problem below       - Sildenafil 20mg q8  - MAP 65-85mmHg, at goal. cont hydralazine 50 mg q8h  - Medtronic ICD CRT:  He is 99% RV and LV paced.  \"He has significant RV dysfunction on echo potential that the leads are not impeding the tricuspid valve are affecting the right ventricle\"  - LDH baseline  200s, stable no evidence of hemolysis  - DL CDI with dry kit twice weekly. DL site healed well with full ingrowth. Pt approved for shower protocol  - Pt attending cardiac rehab outpt  Epistaxis-resolved  - cont outpt mgmt with Afrin/ocean nasal spray/Ayr gel  - hgb stable   - rhino rocket soaked in afrin placed in ER on 7/11/19  - rhino rocket removed 7/15, replaced d/t bleeding, then d/c'd 7/19  - ENT following as oupt  Chronic Afib  -EKG 2/24/20: A-flutter, reviewd by Dr. 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

## 2022-11-18 NOTE — H&P ADULT - NSHPPHYSICALEXAM_GEN_ALL_CORE
Vital Signs Last 24 Hrs  T(C): 36.7 (18 Nov 2022 11:55), Max: 36.7 (18 Nov 2022 11:55)  T(F): 98.1 (18 Nov 2022 11:55), Max: 98.1 (18 Nov 2022 11:55)  HR: 57 (18 Nov 2022 15:45) (52 - 57)  BP: 157/65 (18 Nov 2022 15:45) (157/65 - 220/69)  BP(mean): 90 (18 Nov 2022 15:45) (87 - 111)  RR: 20 (18 Nov 2022 11:55) (20 - 20)  SpO2: 96% (18 Nov 2022 11:55) (96% - 96%)    Parameters below as of 18 Nov 2022 11:55  Patient On (Oxygen Delivery Method): room air

## 2022-11-19 PROCEDURE — 99232 SBSQ HOSP IP/OBS MODERATE 35: CPT

## 2022-11-19 RX ADMIN — Medication 1 TABLET(S): at 09:36

## 2022-11-19 RX ADMIN — Medication 50 MILLIGRAM(S): at 05:55

## 2022-11-19 RX ADMIN — ROSUVASTATIN CALCIUM 5 MILLIGRAM(S): 5 TABLET ORAL at 21:27

## 2022-11-19 RX ADMIN — Medication 120 MILLIGRAM(S): at 21:27

## 2022-11-19 RX ADMIN — Medication 25 MICROGRAM(S): at 05:56

## 2022-11-19 RX ADMIN — Medication 50 MILLIGRAM(S): at 15:23

## 2022-11-19 RX ADMIN — Medication 50 MILLIGRAM(S): at 21:27

## 2022-11-19 RX ADMIN — VALSARTAN 320 MILLIGRAM(S): 80 TABLET ORAL at 10:34

## 2022-11-19 NOTE — PROGRESS NOTE ADULT - SUBJECTIVE AND OBJECTIVE BOX
Chief Complaint:    Interval Hx:    ROS: Multi system review is comprehensively negative x 10 systems except as above    Vitals:    Exam:  Gen:  HEENT:  Neck:  CVS:  Chest:  Abd:  Ext:  Skin:  Neuro:  Mood:    Labs:    Micro:    Imaging:    Cardiac Testing:    Meds:   Chief Complaint: Headache, elevated blood pressure    Interval Hx: Patient seen and examined earlier today. No complaints. Headache resolved. No chest pain or tightness. No dyspnea. No abdominal complaints. No change in urine output. BPs somewhat improved.     ROS: Multi system review is comprehensively negative x 10 systems except as above    Vitals:  T(F): 98.4 (19 Nov 2022 20:52), Max: 98.4 (19 Nov 2022 20:52)  HR: 51 (19 Nov 2022 21:15) (47 - 52)  BP: 135/45 (19 Nov 2022 21:15) (122/44 - 145/58)  RR: 18 (19 Nov 2022 20:52) (18 - 18)  SpO2: 96% (19 Nov 2022 20:52) (96% - 96%) on RA    Exam:  Gen: Comfortable  HEENT: Allopecia totalis, PERRL EOMI MMM clear orpharynx  Neck: Supple, no JVD, no LAD  CVS: S1 S2 normal regular, bit bradycardic HR ~50  Chest: CTA B/L  Abd: +BS, soft NT ND   Ext: No edema, no tenderness  Skin: Warm, dry  Neuro: A+OX3, no deficits  Mood: Calm, pleasant    Labs:                        14.9   9.65  )--------( 217      (             42.0       139  |  104  |  27  --------------------<  133  3.6   |  29  |  1.07    Ca  10.4     TPro  8.5  /  Alb  4.5  /  TBili  1.1  /  DBili  x   /  AST  26  /  ALT  28  /  AlkPhos  71      PT: 11.9 sec;   INR: 1.03 ratio  PTT:36.0 sec    Micro:  COVID19 PCR negative    Imaging:  Reviewed    Cardiac Testing:  Reviewed    Meds:  MEDICATIONS  (STANDING):  hydrALAZINE 50 milliGRAM(s) Oral three times a day  hydrochlorothiazide 12.5 milliGRAM(s) Oral daily  levothyroxine 25 MICROGram(s) Oral daily  multivitamin 1 Tablet(s) Oral daily  propranolol  milliGRAM(s) Oral daily  rosuvastatin 5 milliGRAM(s) Oral at bedtime  valsartan 320 milliGRAM(s) Oral daily    MEDICATIONS  (PRN):  acetaminophen     Tablet .. 650 milliGRAM(s) Oral every 6 hours PRN Temp greater or equal to 38C (100.4F), Mild Pain (1 - 3)  aluminum hydroxide/magnesium hydroxide/simethicone Suspension 30 milliLiter(s) Oral every 4 hours PRN Dyspepsia  hydrALAZINE Injectable 5 milliGRAM(s) IV Push every 8 hours PRN Hypertension  melatonin 3 milliGRAM(s) Oral at bedtime PRN Insomnia  ondansetron Injectable 4 milliGRAM(s) IV Push every 8 hours PRN Nausea and/or Vomiting

## 2022-11-19 NOTE — PROGRESS NOTE ADULT - ASSESSMENT
81 yo woman with HTN, hypothyroidism, allopecia totalis, diverticular disease, p/w concern re uncontrolled HTN, headache, markedly elevated BPs despite apparent adherence to her anti HTN regimen. /90 in the office associated with headache. No focal neuro deficits. No other acute symptoms. In the EP BP 220s/60s. HR 50s. RR O2 WNL. Trop negative. Cr WNL. She was given amlodipine and hydralazine with improvement in BP to 150/60. EKG sinus lila. Triaged to 3 E.    HTN emergency  BP 220s/60s with headaches. No neurological changes. No chest pain. No dyspnea. No change in urine output. Trop negative. Cr WNL. Lungs clear. Increased hydralazine to TID dosing from BID dosing, continued other home meds. BP now improving. HR remains 50 and will continue to monitor.     Hypothyroid  Continue levothyroxine      DIspo ANTICIPATE DC HOME TOMORROW

## 2022-11-20 ENCOUNTER — TRANSCRIPTION ENCOUNTER (OUTPATIENT)
Age: 82
End: 2022-11-20

## 2022-11-20 VITALS — HEART RATE: 50 BPM | SYSTOLIC BLOOD PRESSURE: 155 MMHG | DIASTOLIC BLOOD PRESSURE: 60 MMHG

## 2022-11-20 PROCEDURE — 99239 HOSP IP/OBS DSCHRG MGMT >30: CPT

## 2022-11-20 RX ORDER — HYDRALAZINE HCL 50 MG
1 TABLET ORAL
Qty: 90 | Refills: 0
Start: 2022-11-20

## 2022-11-20 RX ORDER — HYDRALAZINE HCL 50 MG
1 TABLET ORAL
Qty: 0 | Refills: 0 | DISCHARGE

## 2022-11-20 RX ADMIN — Medication 50 MILLIGRAM(S): at 12:59

## 2022-11-20 RX ADMIN — Medication 50 MILLIGRAM(S): at 06:07

## 2022-11-20 RX ADMIN — Medication 1 TABLET(S): at 09:28

## 2022-11-20 RX ADMIN — VALSARTAN 320 MILLIGRAM(S): 80 TABLET ORAL at 09:28

## 2022-11-20 RX ADMIN — Medication 25 MICROGRAM(S): at 06:07

## 2022-11-20 NOTE — DISCHARGE NOTE NURSING/CASE MANAGEMENT/SOCIAL WORK - NURSING SECTION COMPLETE
HIRO/CVN with Dr Carrington Steven. Time out 1520. Start time [id number], End time 32 61 16   Patient received Versed 4 mg and Fentanyl 75 mcg for sedation  HIRO with no clots  Attempted conversion at 50 joules unsuccessful, second attempt at 150 joules successful. Patient/Caregiver provided printed discharge information.

## 2022-11-20 NOTE — DISCHARGE NOTE NURSING/CASE MANAGEMENT/SOCIAL WORK - NSDCPEFALRISK_GEN_ALL_CORE
For information on Fall & Injury Prevention, visit: [email or web address] OR  [email or web address] OR  https://www.cdc.gov/steadi/patient.html

## 2022-11-20 NOTE — DISCHARGE NOTE PROVIDER - HOSPITAL COURSE
83 yo woman with HTN, hypothyroidism, allopecia totalis, diverticular disease, p/w concern re uncontrolled HTN, headache, markedly elevated BPs despite apparent adherence to her anti HTN regimen. /90 in the office associated with headache. No other acute symptoms. In the EP BP 220s/60s. HR 50s. RR O2 WNL. On exam no focal neurological deficits. Lungs clear. Regular bradycardia. Benign abdomen. On labs, Cr WNL. Troponin negative. EKG sinus bradycardia. CXR clear. She was given amlodipine and hydralazine by the ED with improvement in BP to 150/60. Triaged to 3 E.    HTN emergency  BP 220s/60s with headaches. No other symptoms. No change in vision or speech. No dizziness or imbalance. No focal weakness or numbness. No chest pain. No dyspnea. No change in urine output. On exam, no neurological changes. Lungs clear. Regular mild bradycardia. Cr WNL. Trop negative. Discussed shifting away from propranolol given bradycardia. Patient wishes to continue as this is a longstanding medication of hers that she has tolerated well. Increased hydralazine to TID dosing from BID dosing, continued other home meds. BP now improving. HR remains 50 and stable. Patient now asymptomatic, stable for discharge home. Close follow up with Primary Care for BP, HR check as outpatient. Despite the continuation and actual increase in dosing frequency of hydralazine this visit, would consider shifting away from it if there is an issue with medication non-adherence.     Sinus bradycardia  HR 50s. Asymptomatic from this standpoint. No dizziness. No lightheadedness. No fatigue. No shortness of breath. Close follow up with Primary Care for BP, HR check as outpatient.     Hypothyroid  TSH WNL. Continue levothyroxine    Discharge Exam:  Afebrile  /45 HR 51 RR 18  O2 96% on room air  Gen: Comfortable  HEENT: Allopecia totalis, PERRL EOMI MMM clear orpharynx  Neck: Supple, no JVD, no LAD  CVS: S1 S2 normal regular, bit bradycardic HR ~50  Chest: CTA B/L  Abd: +BS, soft NT ND   Ext: No edema, no tenderness  Skin: Warm, dry  Neuro: A+OX3, no deficits  Mood: Calm, pleasant    Labs:                        14.9   9.65  )--------( 217                   42.0       139  |  104  |  27  --------------------<  133  3.6   |  29  |  1.07    Ca  10.4     TPro  8.5  /  Alb  4.5  /  TBili  1.1  /  DBili  x   /  AST  26  /  ALT  28  /  AlkPhos  71      PT: 11.9 sec;   INR: 1.03 ratio  PTT:36.0 sec    Micro:  COVID19 PCR negative    Imaging:  CXR 11/18: The heart is not enlarged. The lungs are clear. There are no pleural effusions. There is no pneumothorax.    Cardiac Testing:  EKG 11/18: Sinus bradycardia, rate 49. Incomplete right bundle branch block. Left anterior fascicular block.   83 yo woman with HTN, hypothyroidism, allopecia totalis, diverticular disease, p/w concern re uncontrolled HTN, headache, markedly elevated BPs despite apparent adherence to her anti HTN regimen. /90 in the office associated with headache. No other acute symptoms. In the EP BP 220s/60s. HR 50s. RR O2 WNL. On exam no focal neurological deficits. Lungs clear. Regular bradycardia. Benign abdomen. On labs, Cr WNL. Troponin negative. EKG sinus bradycardia. CXR clear. She was given amlodipine and hydralazine by the ED with improvement in BP to 150/60. Triaged to 3 E.    HTN emergency  BP 220s/60s with headaches. No other symptoms. No change in vision or speech. No dizziness or imbalance. No focal weakness or numbness. No chest pain. No dyspnea. No change in urine output. On exam, no neurological changes. Lungs clear. Regular mild bradycardia. Cr WNL. Trop negative. We recommended reducing the dose of her propranolol given bradycardia. Patient wishes to continue, even at the current dose, as this is a longstanding medication of hers that she has tolerated well. Increased hydralazine to TID dosing from BID dosing, continued other home meds. BP now improving. HR remains 50 and stable. Patient now asymptomatic, stable for discharge home. Close follow up with Primary Care for BP, HR check as outpatient. Despite the continuation and actual increase in dosing frequency of hydralazine this visit, would consider shifting away from it if there is an issue with medication non-adherence.     Sinus bradycardia  HR 50s. Asymptomatic from this standpoint. No dizziness. No lightheadedness. No fatigue. No shortness of breath. Close follow up with Primary Care for BP, HR check as outpatient.     Hypothyroid  TSH WNL. Continue levothyroxine    Discharge Exam:  Afebrile  /45 HR 51 RR 18  O2 96% on room air  Gen: Comfortable  HEENT: Allopecia totalis, PERRL EOMI MMM clear orpharynx  Neck: Supple, no JVD, no LAD  CVS: S1 S2 normal regular, bit bradycardic HR ~50  Chest: CTA B/L  Abd: +BS, soft NT ND   Ext: No edema, no tenderness  Skin: Warm, dry  Neuro: A+OX3, no deficits  Mood: Calm, pleasant    Labs:                        14.9   9.65  )--------( 217                   42.0       139  |  104  |  27  --------------------<  133  3.6   |  29  |  1.07    Ca  10.4     TPro  8.5  /  Alb  4.5  /  TBili  1.1  /  DBili  x   /  AST  26  /  ALT  28  /  AlkPhos  71      PT: 11.9 sec;   INR: 1.03 ratio  PTT:36.0 sec    Micro:  COVID19 PCR negative    Imaging:  CXR 11/18: The heart is not enlarged. The lungs are clear. There are no pleural effusions. There is no pneumothorax.    Cardiac Testing:  EKG 11/18: Sinus bradycardia, rate 49. Incomplete right bundle branch block. Left anterior fascicular block.

## 2022-11-20 NOTE — DISCHARGE NOTE PROVIDER - PROVIDER TOKENS
PROVIDER:[TOKEN:[[id number]],FOLLOWUP:[2 weeks]] PROVIDER:[TOKEN:[[id number]],FOLLOWUP:[2 weeks]],PROVIDER:[TOKEN:[[id number]],FOLLOWUP:[2 weeks]]

## 2022-11-20 NOTE — DISCHARGE NOTE PROVIDER - NSDCMRMEDTOKEN_GEN_ALL_CORE_FT
CoQ10 100 mg oral capsule: 1 cap(s) orally once a day  Diovan 320 mg oral tablet: 1 tab(s) orally once a day  hydrALAZINE 50 mg oral tablet: 1 tab(s) orally 3 times a day  hydroCHLOROthiazide 12.5 mg oral capsule: 1 cap(s) orally once a day  levothyroxine 25 mcg (0.025 mg) oral tablet: 1 tab(s) orally once a day  Multiple Vitamins oral tablet: 1 tab(s) orally once a day  propranolol 120 mg oral capsule, extended release: 1 cap(s) orally once a day  rosuvastatin 5 mg oral tablet: 1 tab(s) orally once a day (at bedtime)  Vitamin D3 125 mcg (5000 intl units) oral tablet: 1 tab(s) orally once a day  Zinc 140 mg (as elemental zinc 50 mg) oral tablet: 1 tab(s) orally once a day

## 2022-11-20 NOTE — DISCHARGE NOTE PROVIDER - NSDCFUSCHEDAPPT_GEN_ALL_CORE_FT
[facility]  [address]  Scheduled Appointment: 11/29/2022    [facility]  [address]  Scheduled Appointment: 12/13/2022    [facility]  [address]  Scheduled Appointment: 12/27/2022    [facility]  [address]  Scheduled Appointment: 01/10/2023    [facility]  [address]  Scheduled Appointment: 01/24/2023    [facility]  [address]  Scheduled Appointment: 02/07/2023

## 2022-11-20 NOTE — DISCHARGE NOTE PROVIDER - CARE PROVIDER_API CALL
Girish Bolanos  INTERNAL MEDICINE  [address] [place] [address]  Phone: [phone]  Fax: [phone]  Follow Up Time: 2 weeks   Girish Bolanos  INTERNAL MEDICINE  [address] [place] [address]  Phone: [phone]  Fax: [phone]  Follow Up Time: 2 weeks    Nino Moreno)  Cardiovascular Disease; Internal Medicine  [address] [place] [address]  Phone: [phone]  Fax: [phone]  Follow Up Time: 2 weeks

## 2022-11-20 NOTE — DISCHARGE NOTE PROVIDER - NSDCCAREPROVSEEN_GEN_ALL_CORE_FT
Keyshawn Curran (Hospital Medicine)  Duke Narvaez ([facility])  Doroteo Ramírez ([facility])

## 2022-11-20 NOTE — DISCHARGE NOTE PROVIDER - NSDCCPCAREPLAN_GEN_ALL_CORE_FT
PRINCIPAL DISCHARGE DIAGNOSIS  Diagnosis: Hypertensive urgency  Assessment and Plan of Treatment: You presented to the hospital for headache and markedly elevated blood pressures. Reassuringly, you had no neurological deficits on exam, clear lungs, normal kidney function and no cardiac compliations such as heart failure, heart ischemia or infarction / heart attack. Your hydralazine was increased from twice a day to three times a day. Other home medications were continued without changes. Blood pressure measurements are now acceptably controlled for discharge home. Current blood pressure regimen upon discharge is valsartan 320mg daily, propranolol 120mg daily, hydrochlorothiazine 12.5mg daily and hydralazine 50mg three times a day. Continue taking these medications as such and follow up within 1-2 weeks of discharge with your primary care provider for post-discharge check up, specifically blood pressure and heart rate assessment and further medication guidance. Despite the continuation and actual increase in dosing frequency of hydralazine this visit, would discuss possibly shifting away from the hydralazine to another antibhypertensive if there is an issue with adherence to the medication schedule.      SECONDARY DISCHARGE DIAGNOSES  Diagnosis: Sinus bradycardia  Assessment and Plan of Treatment: This visit, you were also noted to have mildly slow heart rate. Reassuringly, you were asymptomatic from this standpoint. Close follow up with Primary Care for BP, HR check as outpatient.       Diagnosis: Hypothyroidism  Assessment and Plan of Treatment: Thyroid stimulating hormone level normal. Continue levothyroxine.

## 2022-11-20 NOTE — DISCHARGE NOTE NURSING/CASE MANAGEMENT/SOCIAL WORK - PATIENT PORTAL LINK FT
You can access the FollowMyHealth Patient Portal offered by [facility] by registering at the following website: [email or web address]. By joining [employer]’s FollowMyHealth portal, you will also be able to view your health information using other applications (apps) compatible with our system.

## 2022-11-23 DIAGNOSIS — Z79.890 HORMONE REPLACEMENT THERAPY: ICD-10-CM

## 2022-11-23 DIAGNOSIS — E66.9 OBESITY, UNSPECIFIED: ICD-10-CM

## 2022-11-23 DIAGNOSIS — E78.5 HYPERLIPIDEMIA, UNSPECIFIED: ICD-10-CM

## 2022-11-23 DIAGNOSIS — Z88.0 ALLERGY STATUS TO PENICILLIN: ICD-10-CM

## 2022-11-23 DIAGNOSIS — I10 ESSENTIAL (PRIMARY) HYPERTENSION: ICD-10-CM

## 2022-11-23 DIAGNOSIS — L63.0 ALOPECIA (CAPITIS) TOTALIS: ICD-10-CM

## 2022-11-23 DIAGNOSIS — Z88.5 ALLERGY STATUS TO NARCOTIC AGENT: ICD-10-CM

## 2022-11-23 DIAGNOSIS — E03.9 HYPOTHYROIDISM, UNSPECIFIED: ICD-10-CM

## 2022-11-23 DIAGNOSIS — Z79.82 LONG TERM (CURRENT) USE OF ASPIRIN: ICD-10-CM

## 2022-11-23 DIAGNOSIS — Z88.1 ALLERGY STATUS TO OTHER ANTIBIOTIC AGENTS STATUS: ICD-10-CM

## 2022-11-23 DIAGNOSIS — R00.1 BRADYCARDIA, UNSPECIFIED: ICD-10-CM

## 2022-11-23 DIAGNOSIS — I16.0 HYPERTENSIVE URGENCY: ICD-10-CM

## 2022-11-28 ENCOUNTER — NON-APPOINTMENT (OUTPATIENT)
Age: 82
End: 2022-11-28

## 2022-11-29 ENCOUNTER — APPOINTMENT (OUTPATIENT)
Dept: PEDIATRIC ALLERGY IMMUNOLOGY | Facility: CLINIC | Age: 82
End: 2022-11-29

## 2022-12-13 ENCOUNTER — APPOINTMENT (OUTPATIENT)
Dept: PEDIATRIC ALLERGY IMMUNOLOGY | Facility: CLINIC | Age: 82
End: 2022-12-13

## 2022-12-13 PROCEDURE — 95117 IMMUNOTHERAPY INJECTIONS: CPT

## 2022-12-28 ENCOUNTER — APPOINTMENT (OUTPATIENT)
Dept: PEDIATRIC ALLERGY IMMUNOLOGY | Facility: CLINIC | Age: 82
End: 2022-12-28
Payer: MEDICARE

## 2022-12-28 PROCEDURE — 95117 IMMUNOTHERAPY INJECTIONS: CPT

## 2023-01-10 ENCOUNTER — APPOINTMENT (OUTPATIENT)
Dept: PEDIATRIC ALLERGY IMMUNOLOGY | Facility: CLINIC | Age: 83
End: 2023-01-10
Payer: MEDICARE

## 2023-01-10 PROCEDURE — 95165 ANTIGEN THERAPY SERVICES: CPT

## 2023-01-10 PROCEDURE — 95117 IMMUNOTHERAPY INJECTIONS: CPT

## 2023-01-24 ENCOUNTER — APPOINTMENT (OUTPATIENT)
Dept: PEDIATRIC ALLERGY IMMUNOLOGY | Facility: CLINIC | Age: 83
End: 2023-01-24
Payer: MEDICARE

## 2023-01-24 PROCEDURE — 95117 IMMUNOTHERAPY INJECTIONS: CPT

## 2023-02-07 ENCOUNTER — APPOINTMENT (OUTPATIENT)
Dept: PEDIATRIC ALLERGY IMMUNOLOGY | Facility: CLINIC | Age: 83
End: 2023-02-07
Payer: MEDICARE

## 2023-02-07 ENCOUNTER — APPOINTMENT (OUTPATIENT)
Dept: PEDIATRIC ALLERGY IMMUNOLOGY | Facility: CLINIC | Age: 83
End: 2023-02-07

## 2023-02-07 PROCEDURE — 95117 IMMUNOTHERAPY INJECTIONS: CPT

## 2023-02-21 ENCOUNTER — APPOINTMENT (OUTPATIENT)
Dept: PEDIATRIC ALLERGY IMMUNOLOGY | Facility: CLINIC | Age: 83
End: 2023-02-21
Payer: MEDICARE

## 2023-02-21 PROCEDURE — 95117 IMMUNOTHERAPY INJECTIONS: CPT

## 2023-03-07 ENCOUNTER — APPOINTMENT (OUTPATIENT)
Dept: PEDIATRIC ALLERGY IMMUNOLOGY | Facility: CLINIC | Age: 83
End: 2023-03-07
Payer: MEDICARE

## 2023-03-07 PROCEDURE — 95117 IMMUNOTHERAPY INJECTIONS: CPT

## 2023-03-21 ENCOUNTER — NON-APPOINTMENT (OUTPATIENT)
Age: 83
End: 2023-03-21

## 2023-03-21 ENCOUNTER — APPOINTMENT (OUTPATIENT)
Dept: PEDIATRIC ALLERGY IMMUNOLOGY | Facility: CLINIC | Age: 83
End: 2023-03-21
Payer: MEDICARE

## 2023-03-21 PROCEDURE — 95117 IMMUNOTHERAPY INJECTIONS: CPT

## 2023-03-27 ENCOUNTER — APPOINTMENT (OUTPATIENT)
Dept: OBGYN | Facility: CLINIC | Age: 83
End: 2023-03-27

## 2023-03-28 ENCOUNTER — APPOINTMENT (OUTPATIENT)
Dept: PEDIATRIC ALLERGY IMMUNOLOGY | Facility: CLINIC | Age: 83
End: 2023-03-28
Payer: MEDICARE

## 2023-03-28 PROCEDURE — 95117 IMMUNOTHERAPY INJECTIONS: CPT

## 2023-04-04 ENCOUNTER — APPOINTMENT (OUTPATIENT)
Dept: PEDIATRIC ALLERGY IMMUNOLOGY | Facility: CLINIC | Age: 83
End: 2023-04-04
Payer: MEDICARE

## 2023-04-04 PROCEDURE — 95117 IMMUNOTHERAPY INJECTIONS: CPT

## 2023-04-18 ENCOUNTER — APPOINTMENT (OUTPATIENT)
Dept: PEDIATRIC ALLERGY IMMUNOLOGY | Facility: CLINIC | Age: 83
End: 2023-04-18
Payer: MEDICARE

## 2023-04-18 PROCEDURE — 95117 IMMUNOTHERAPY INJECTIONS: CPT

## 2023-05-02 ENCOUNTER — APPOINTMENT (OUTPATIENT)
Dept: PEDIATRIC ALLERGY IMMUNOLOGY | Facility: CLINIC | Age: 83
End: 2023-05-02

## 2023-05-09 ENCOUNTER — APPOINTMENT (OUTPATIENT)
Dept: PEDIATRIC ALLERGY IMMUNOLOGY | Facility: CLINIC | Age: 83
End: 2023-05-09
Payer: MEDICARE

## 2023-05-09 PROCEDURE — 95117 IMMUNOTHERAPY INJECTIONS: CPT

## 2023-05-16 ENCOUNTER — APPOINTMENT (OUTPATIENT)
Dept: PEDIATRIC ALLERGY IMMUNOLOGY | Facility: CLINIC | Age: 83
End: 2023-05-16
Payer: MEDICARE

## 2023-05-16 PROCEDURE — 95117 IMMUNOTHERAPY INJECTIONS: CPT

## 2023-05-16 PROCEDURE — 95165 ANTIGEN THERAPY SERVICES: CPT

## 2023-05-23 ENCOUNTER — NON-APPOINTMENT (OUTPATIENT)
Age: 83
End: 2023-05-23

## 2023-05-23 ENCOUNTER — APPOINTMENT (OUTPATIENT)
Dept: OPHTHALMOLOGY | Facility: CLINIC | Age: 83
End: 2023-05-23
Payer: MEDICARE

## 2023-05-23 PROCEDURE — 99214 OFFICE O/P EST MOD 30 MIN: CPT

## 2023-05-30 ENCOUNTER — APPOINTMENT (OUTPATIENT)
Dept: PEDIATRIC ALLERGY IMMUNOLOGY | Facility: CLINIC | Age: 83
End: 2023-05-30
Payer: MEDICARE

## 2023-05-30 PROCEDURE — 95117 IMMUNOTHERAPY INJECTIONS: CPT

## 2023-06-06 ENCOUNTER — APPOINTMENT (OUTPATIENT)
Dept: PEDIATRIC ALLERGY IMMUNOLOGY | Facility: CLINIC | Age: 83
End: 2023-06-06
Payer: MEDICARE

## 2023-06-06 PROCEDURE — 95117 IMMUNOTHERAPY INJECTIONS: CPT

## 2023-06-20 ENCOUNTER — APPOINTMENT (OUTPATIENT)
Dept: PEDIATRIC ALLERGY IMMUNOLOGY | Facility: CLINIC | Age: 83
End: 2023-06-20
Payer: MEDICARE

## 2023-06-20 PROCEDURE — 95117 IMMUNOTHERAPY INJECTIONS: CPT

## 2023-06-27 ENCOUNTER — APPOINTMENT (OUTPATIENT)
Dept: PEDIATRIC ALLERGY IMMUNOLOGY | Facility: CLINIC | Age: 83
End: 2023-06-27
Payer: MEDICARE

## 2023-06-27 PROCEDURE — 95117 IMMUNOTHERAPY INJECTIONS: CPT

## 2023-07-11 ENCOUNTER — APPOINTMENT (OUTPATIENT)
Dept: PEDIATRIC ALLERGY IMMUNOLOGY | Facility: CLINIC | Age: 83
End: 2023-07-11
Payer: MEDICARE

## 2023-07-11 PROCEDURE — 95117 IMMUNOTHERAPY INJECTIONS: CPT

## 2023-07-19 ENCOUNTER — NON-APPOINTMENT (OUTPATIENT)
Age: 83
End: 2023-07-19

## 2023-07-19 ENCOUNTER — APPOINTMENT (OUTPATIENT)
Dept: OPHTHALMOLOGY | Facility: CLINIC | Age: 83
End: 2023-07-19
Payer: MEDICARE

## 2023-07-19 PROCEDURE — 99214 OFFICE O/P EST MOD 30 MIN: CPT

## 2023-07-19 PROCEDURE — 92134 CPTRZ OPH DX IMG PST SGM RTA: CPT

## 2023-07-25 ENCOUNTER — APPOINTMENT (OUTPATIENT)
Dept: PEDIATRIC ALLERGY IMMUNOLOGY | Facility: CLINIC | Age: 83
End: 2023-07-25
Payer: MEDICARE

## 2023-07-25 PROCEDURE — 95117 IMMUNOTHERAPY INJECTIONS: CPT

## 2023-07-27 ENCOUNTER — APPOINTMENT (OUTPATIENT)
Dept: PEDIATRIC ALLERGY IMMUNOLOGY | Facility: CLINIC | Age: 83
End: 2023-07-27

## 2023-08-03 ENCOUNTER — APPOINTMENT (OUTPATIENT)
Dept: PEDIATRIC ALLERGY IMMUNOLOGY | Facility: CLINIC | Age: 83
End: 2023-08-03
Payer: MEDICARE

## 2023-08-03 PROCEDURE — 95117 IMMUNOTHERAPY INJECTIONS: CPT

## 2023-08-08 ENCOUNTER — NON-APPOINTMENT (OUTPATIENT)
Age: 83
End: 2023-08-08

## 2023-08-08 ENCOUNTER — APPOINTMENT (OUTPATIENT)
Dept: OPHTHALMOLOGY | Facility: HOSPITAL | Age: 83
End: 2023-08-08
Payer: MEDICARE

## 2023-08-08 PROCEDURE — 66984 XCAPSL CTRC RMVL W/O ECP: CPT | Mod: RT

## 2023-08-09 ENCOUNTER — APPOINTMENT (OUTPATIENT)
Dept: OPHTHALMOLOGY | Facility: CLINIC | Age: 83
End: 2023-08-09
Payer: MEDICARE

## 2023-08-09 ENCOUNTER — NON-APPOINTMENT (OUTPATIENT)
Age: 83
End: 2023-08-09

## 2023-08-09 PROCEDURE — 99024 POSTOP FOLLOW-UP VISIT: CPT

## 2023-08-16 ENCOUNTER — NON-APPOINTMENT (OUTPATIENT)
Age: 83
End: 2023-08-16

## 2023-08-16 ENCOUNTER — APPOINTMENT (OUTPATIENT)
Dept: OPHTHALMOLOGY | Facility: CLINIC | Age: 83
End: 2023-08-16
Payer: MEDICARE

## 2023-08-16 PROCEDURE — 92136 OPHTHALMIC BIOMETRY: CPT | Mod: 26,LT

## 2023-08-16 PROCEDURE — 99213 OFFICE O/P EST LOW 20 MIN: CPT | Mod: 24

## 2023-08-22 ENCOUNTER — APPOINTMENT (OUTPATIENT)
Dept: PEDIATRIC ALLERGY IMMUNOLOGY | Facility: CLINIC | Age: 83
End: 2023-08-22
Payer: MEDICARE

## 2023-08-22 PROCEDURE — 95117 IMMUNOTHERAPY INJECTIONS: CPT

## 2023-09-05 ENCOUNTER — APPOINTMENT (OUTPATIENT)
Dept: PEDIATRIC ALLERGY IMMUNOLOGY | Facility: CLINIC | Age: 83
End: 2023-09-05
Payer: MEDICARE

## 2023-09-05 PROCEDURE — 95117 IMMUNOTHERAPY INJECTIONS: CPT

## 2023-09-07 ENCOUNTER — APPOINTMENT (OUTPATIENT)
Dept: OPHTHALMOLOGY | Facility: CLINIC | Age: 83
End: 2023-09-07

## 2023-09-12 ENCOUNTER — APPOINTMENT (OUTPATIENT)
Dept: OPHTHALMOLOGY | Facility: HOSPITAL | Age: 83
End: 2023-09-12
Payer: MEDICARE

## 2023-09-12 ENCOUNTER — NON-APPOINTMENT (OUTPATIENT)
Age: 83
End: 2023-09-12

## 2023-09-12 PROCEDURE — 66984 XCAPSL CTRC RMVL W/O ECP: CPT | Mod: 79,LT

## 2023-09-13 ENCOUNTER — APPOINTMENT (OUTPATIENT)
Dept: OPHTHALMOLOGY | Facility: CLINIC | Age: 83
End: 2023-09-13
Payer: MEDICARE

## 2023-09-13 ENCOUNTER — NON-APPOINTMENT (OUTPATIENT)
Age: 83
End: 2023-09-13

## 2023-09-13 PROCEDURE — 99024 POSTOP FOLLOW-UP VISIT: CPT

## 2023-09-20 ENCOUNTER — APPOINTMENT (OUTPATIENT)
Dept: OPHTHALMOLOGY | Facility: CLINIC | Age: 83
End: 2023-09-20
Payer: MEDICARE

## 2023-09-20 ENCOUNTER — NON-APPOINTMENT (OUTPATIENT)
Age: 83
End: 2023-09-20

## 2023-09-20 PROCEDURE — 99024 POSTOP FOLLOW-UP VISIT: CPT

## 2023-09-21 ENCOUNTER — APPOINTMENT (OUTPATIENT)
Dept: PEDIATRIC ALLERGY IMMUNOLOGY | Facility: CLINIC | Age: 83
End: 2023-09-21
Payer: MEDICARE

## 2023-09-21 PROCEDURE — 95117 IMMUNOTHERAPY INJECTIONS: CPT

## 2023-10-05 ENCOUNTER — APPOINTMENT (OUTPATIENT)
Dept: PEDIATRIC ALLERGY IMMUNOLOGY | Facility: CLINIC | Age: 83
End: 2023-10-05
Payer: MEDICARE

## 2023-10-05 PROCEDURE — 95165 ANTIGEN THERAPY SERVICES: CPT

## 2023-10-05 PROCEDURE — 95117 IMMUNOTHERAPY INJECTIONS: CPT

## 2023-10-16 ENCOUNTER — NON-APPOINTMENT (OUTPATIENT)
Age: 83
End: 2023-10-16

## 2023-10-17 ENCOUNTER — APPOINTMENT (OUTPATIENT)
Dept: PEDIATRIC ALLERGY IMMUNOLOGY | Facility: CLINIC | Age: 83
End: 2023-10-17
Payer: MEDICARE

## 2023-10-17 PROCEDURE — 95117 IMMUNOTHERAPY INJECTIONS: CPT

## 2023-10-19 ENCOUNTER — APPOINTMENT (OUTPATIENT)
Dept: OPHTHALMOLOGY | Facility: CLINIC | Age: 83
End: 2023-10-19
Payer: MEDICARE

## 2023-10-19 ENCOUNTER — NON-APPOINTMENT (OUTPATIENT)
Age: 83
End: 2023-10-19

## 2023-10-19 PROCEDURE — 99024 POSTOP FOLLOW-UP VISIT: CPT

## 2023-10-31 ENCOUNTER — APPOINTMENT (OUTPATIENT)
Dept: PEDIATRIC ALLERGY IMMUNOLOGY | Facility: CLINIC | Age: 83
End: 2023-10-31
Payer: MEDICARE

## 2023-10-31 PROCEDURE — 95117 IMMUNOTHERAPY INJECTIONS: CPT

## 2023-11-14 ENCOUNTER — APPOINTMENT (OUTPATIENT)
Dept: PEDIATRIC ALLERGY IMMUNOLOGY | Facility: CLINIC | Age: 83
End: 2023-11-14
Payer: MEDICARE

## 2023-11-14 ENCOUNTER — APPOINTMENT (OUTPATIENT)
Dept: PEDIATRIC ALLERGY IMMUNOLOGY | Facility: CLINIC | Age: 83
End: 2023-11-14

## 2023-11-14 DIAGNOSIS — J30.89 OTHER ALLERGIC RHINITIS: ICD-10-CM

## 2023-11-14 PROCEDURE — 95117 IMMUNOTHERAPY INJECTIONS: CPT

## 2023-11-28 ENCOUNTER — APPOINTMENT (OUTPATIENT)
Dept: PEDIATRIC ALLERGY IMMUNOLOGY | Facility: CLINIC | Age: 83
End: 2023-11-28
Payer: MEDICARE

## 2023-11-28 VITALS
SYSTOLIC BLOOD PRESSURE: 174 MMHG | DIASTOLIC BLOOD PRESSURE: 70 MMHG | OXYGEN SATURATION: 98 % | HEART RATE: 44 BPM | TEMPERATURE: 97.7 F

## 2023-11-28 DIAGNOSIS — R09.82 POSTNASAL DRIP: ICD-10-CM

## 2023-11-28 PROCEDURE — 95117 IMMUNOTHERAPY INJECTIONS: CPT

## 2023-11-28 PROCEDURE — 99213 OFFICE O/P EST LOW 20 MIN: CPT | Mod: 25

## 2023-12-14 ENCOUNTER — APPOINTMENT (OUTPATIENT)
Dept: PEDIATRIC ALLERGY IMMUNOLOGY | Facility: CLINIC | Age: 83
End: 2023-12-14
Payer: MEDICARE

## 2023-12-14 PROCEDURE — 95117 IMMUNOTHERAPY INJECTIONS: CPT

## 2024-01-11 ENCOUNTER — APPOINTMENT (OUTPATIENT)
Dept: PEDIATRIC ALLERGY IMMUNOLOGY | Facility: CLINIC | Age: 84
End: 2024-01-11
Payer: MEDICARE

## 2024-01-11 PROCEDURE — 95117 IMMUNOTHERAPY INJECTIONS: CPT

## 2024-01-18 ENCOUNTER — APPOINTMENT (OUTPATIENT)
Dept: PEDIATRIC ALLERGY IMMUNOLOGY | Facility: CLINIC | Age: 84
End: 2024-01-18
Payer: MEDICARE

## 2024-01-18 DIAGNOSIS — N95.2 POSTMENOPAUSAL ATROPHIC VAGINITIS: ICD-10-CM

## 2024-01-18 PROCEDURE — 95117 IMMUNOTHERAPY INJECTIONS: CPT

## 2024-01-31 ENCOUNTER — APPOINTMENT (OUTPATIENT)
Dept: OPHTHALMOLOGY | Facility: CLINIC | Age: 84
End: 2024-01-31
Payer: MEDICARE

## 2024-01-31 ENCOUNTER — NON-APPOINTMENT (OUTPATIENT)
Age: 84
End: 2024-01-31

## 2024-01-31 PROCEDURE — 92014 COMPRE OPH EXAM EST PT 1/>: CPT

## 2024-02-01 ENCOUNTER — APPOINTMENT (OUTPATIENT)
Dept: PEDIATRIC ALLERGY IMMUNOLOGY | Facility: CLINIC | Age: 84
End: 2024-02-01
Payer: MEDICARE

## 2024-02-01 PROCEDURE — 95117 IMMUNOTHERAPY INJECTIONS: CPT

## 2024-02-15 ENCOUNTER — APPOINTMENT (OUTPATIENT)
Dept: PEDIATRIC ALLERGY IMMUNOLOGY | Facility: CLINIC | Age: 84
End: 2024-02-15
Payer: MEDICARE

## 2024-02-15 PROCEDURE — 95117 IMMUNOTHERAPY INJECTIONS: CPT

## 2024-02-29 ENCOUNTER — APPOINTMENT (OUTPATIENT)
Dept: PEDIATRIC ALLERGY IMMUNOLOGY | Facility: CLINIC | Age: 84
End: 2024-02-29
Payer: MEDICARE

## 2024-02-29 PROCEDURE — 95117 IMMUNOTHERAPY INJECTIONS: CPT

## 2024-03-12 ENCOUNTER — APPOINTMENT (OUTPATIENT)
Dept: PEDIATRIC ALLERGY IMMUNOLOGY | Facility: CLINIC | Age: 84
End: 2024-03-12
Payer: MEDICARE

## 2024-03-12 PROCEDURE — 95117 IMMUNOTHERAPY INJECTIONS: CPT

## 2024-03-21 ENCOUNTER — APPOINTMENT (OUTPATIENT)
Dept: PEDIATRIC ALLERGY IMMUNOLOGY | Facility: CLINIC | Age: 84
End: 2024-03-21
Payer: MEDICARE

## 2024-03-21 PROCEDURE — 95117 IMMUNOTHERAPY INJECTIONS: CPT

## 2024-04-04 ENCOUNTER — APPOINTMENT (OUTPATIENT)
Dept: PEDIATRIC ALLERGY IMMUNOLOGY | Facility: CLINIC | Age: 84
End: 2024-04-04
Payer: MEDICARE

## 2024-04-04 PROCEDURE — 95117 IMMUNOTHERAPY INJECTIONS: CPT

## 2024-04-18 ENCOUNTER — APPOINTMENT (OUTPATIENT)
Dept: PEDIATRIC ALLERGY IMMUNOLOGY | Facility: CLINIC | Age: 84
End: 2024-04-18
Payer: MEDICARE

## 2024-04-18 PROCEDURE — 95117 IMMUNOTHERAPY INJECTIONS: CPT

## 2024-04-30 ENCOUNTER — APPOINTMENT (OUTPATIENT)
Dept: PEDIATRIC ALLERGY IMMUNOLOGY | Facility: CLINIC | Age: 84
End: 2024-04-30
Payer: MEDICARE

## 2024-04-30 PROCEDURE — 95117 IMMUNOTHERAPY INJECTIONS: CPT

## 2024-05-14 ENCOUNTER — APPOINTMENT (OUTPATIENT)
Dept: PEDIATRIC ALLERGY IMMUNOLOGY | Facility: CLINIC | Age: 84
End: 2024-05-14
Payer: MEDICARE

## 2024-05-14 PROCEDURE — 95117 IMMUNOTHERAPY INJECTIONS: CPT

## 2024-05-21 ENCOUNTER — APPOINTMENT (OUTPATIENT)
Dept: PEDIATRIC ALLERGY IMMUNOLOGY | Facility: CLINIC | Age: 84
End: 2024-05-21
Payer: MEDICARE

## 2024-05-21 PROCEDURE — 95165 ANTIGEN THERAPY SERVICES: CPT

## 2024-05-24 NOTE — H&P ADULT - NEUROLOGICAL SYMPTOMS
Take the antibiotics as prescribed. Protect the patient from pulling on his catheter.     Urine culture is pending      
headache

## 2024-05-28 ENCOUNTER — APPOINTMENT (OUTPATIENT)
Dept: PEDIATRIC ALLERGY IMMUNOLOGY | Facility: CLINIC | Age: 84
End: 2024-05-28
Payer: MEDICARE

## 2024-05-28 PROCEDURE — 95117 IMMUNOTHERAPY INJECTIONS: CPT

## 2024-05-30 ENCOUNTER — NON-APPOINTMENT (OUTPATIENT)
Age: 84
End: 2024-05-30

## 2024-06-11 ENCOUNTER — APPOINTMENT (OUTPATIENT)
Dept: PEDIATRIC ALLERGY IMMUNOLOGY | Facility: CLINIC | Age: 84
End: 2024-06-11
Payer: MEDICARE

## 2024-06-11 PROCEDURE — 95117 IMMUNOTHERAPY INJECTIONS: CPT

## 2024-06-13 NOTE — PROGRESS NOTE ADULT - REASON FOR ADMISSION
Anesthesia Release from PACU Note    Patient name: Nick Feliciano    Procedure(s): Procedure(s) (LRB):  MYRINGOTOMY, WITH TYMPANOSTOMY TUBE INSERTION (Bilateral)    Anesthesia type: general    Post pain: adequate analgesia    Post assessment: no apparent complications    Last vitals:   Vitals:    06/13/24 0930   BP:    Pulse: (!) 130   Resp: 22   Temp:        Post vital signs: stable    Level of consciousness: alert     Nausea/Vomiting: no nausea/no vomiting    Complications: none    Airway Patency:  patent    Respiratory: unassisted    Cardiovascular: stable and blood pressure at baseline    Hydration: euvolemic    
Chest pain Hypertensive emergency

## 2024-06-21 NOTE — PATIENT PROFILE ADULT - FUNCTIONAL ASSESSMENT - DAILY ACTIVITY 1.
Medicare Wellness Visit  Plan for Preventive Care    A good way for you to stay healthy is to use preventive care.  Medicare covers many services that can help you stay healthy.* The goal of these services is to find any health problems as quickly as possible. Finding problems early can help make them easier to treat.  Your personal plan below lists the services you may need and when they are due.      Health Maintenance Summary     Shingles Vaccine (2 of 3)  Overdue since 7/16/2007    Hepatitis B Vaccine (For Physician/APC Discussion) (3 of 3 - 19+ 3-dose series)  Overdue since 7/29/2008    Medicare Advantage- Medicare Wellness Visit (Yearly - January to December)  Overdue since 1/1/2024    COVID-19 Vaccine (9 - 2023-24 season)  Overdue since 3/7/2024    Influenza Vaccine (Season Ended)  Next due on 9/1/2024    Depression Screening (Yearly)  Next due on 6/17/2025    DTaP/Tdap/Td Vaccine (4 - Td or Tdap)  Next due on 4/16/2029    Pneumococcal Vaccine 65+   Completed    Meningococcal Vaccine   Aged Out    HPV Vaccine   Aged Out           Preventive Care for Women and Men    Heart Screenings (Cardiovascular):  Blood tests are used to check your cholesterol, lipid and triglyceride levels. High levels can increase your risk for heart disease and stroke. High levels can be treated with medications, diet and exercise. Lowering your levels can help keep your heart and blood vessels healthy.  Your provider will order these tests if they are needed.    An ultrasound is done to see if you have an abdominal aortic aneurysm (AAA).  This is an enlargement of one of the main blood vessels that delivers blood to the body.   In the [place], 9,000 deaths are caused by AAA.  You may not even know you have this problem and as many as 1 in 3 people will have a serious problem if it is not treated.  Early diagnosis allows for more effective treatment and cure.  If you have a family history of AAA or are a male age 65-75 who has  smoked, you are at higher risk of an AAA.  Your provider can order this test, if needed.    Colorectal Screening:  There are many tests that are used to check for cancer of your colon and rectum. You and your provider should discuss what test is best for you and when to have it done.  Options include:  Screening Colonoscopy: exam of the entire colon, seen through a flexible lighted tube.  Flexible Sigmoidoscopy: exam of the last third (sigmoid portion) of the colon and rectum, seen through a flexible lighted tube.  Cologuard DNA stool test: a sample of your stool is used to screen for cancer and unseen blood in your stool.  Fecal Occult Blood Test: a sample of your stool is studied to find any unseen blood    Flu Shot:  An immunization that helps to prevent influenza (the flu). You should get this every year. The best time to get the shot is in the fall.    Pneumococcal Shot:  Vaccines help prevent pneumococcal disease, which is any type of illness caused by Streptococcus pneumoniae bacteria. There are two kinds of pneumococcal vaccines available in the [place]:   Pneumococcal conjugate vaccines (PCV20 or [id number]®)  Pneumococcal polysaccharide vaccine (PPSV23 or [id number]®)  For those who have never received any pneumococcal conjugate vaccine, CDC recommends PVC20 for adults 65 years or older and adults 19 through 64 years old with certain medical conditions or risk factors.   For those who have previously received PCV13, this should be followed by a dose of PPSV23.     Hepatitis B Shot:  An immunization that helps to protect people from getting Hepatitis B. Hepatitis B is a virus that spreads through contact with infected blood or body fluids. Many people with the virus do not have symptoms.  The virus can lead to serious problems, such as liver disease. Some people are at higher risk than others. Your doctor will tell you if you need this shot.     Diabetes Screening:  A test to measure sugar (glucose)  in your blood is called a fasting blood sugar. Fasting means you cannot have food or drink for at least 8 hours before the test. This test can detect diabetes long before you may notice symptoms.    Glaucoma Screening:  Glaucoma screening is performed by your eye doctor. The test measures the fluid pressure inside your eyes to determine if you have glaucoma.     Hepatitis C Screening:  A blood test to see if you have the hepatitis C virus.  Hepatitis C attacks the liver and is a major cause of chronic liver disease.  Medicare will cover a single screening for all adults born between 1945 & 1965, or high risk patients (people who have injected illegal drugs or people who have had blood transfusions).  High risk patients who continue to inject illegal drugs can be screened for Hepatitis C every year.    Smoking and Tobacco-Use Cessation Counseling:  Tobacco is the single greatest cause of disease and early death in our country today. Medication and counseling together can increase a person’s chance of quitting for good.   Medicare covers two quitting attempts per year, with four counseling sessions per attempt (eight sessions in a 12 month period)    Preventive Screening tests for Women    Screening Mammograms and Breast Exams:  An x-ray of your breasts to check for breast cancer before you or your doctor may be able to feel it.  If breast cancer is found early it can usually be treated with success.    Pelvic Exams and Pap Tests:  An exam to check for cervical and vaginal cancer. A Pap test is a lab test in which cells are taken from your cervix and sent to the lab to look for signs of cervical cancer. If cancer of the cervix is found early, chances for a cure are good. Testing can generally end at age 65, or if a woman has a hysterectomy for a benign condition. Your provider may recommend more frequent testing if certain abnormal results are found.    Bone Mass Measurements:  A painless x-ray of your bone density to  see if you are at risk for a broken bone. Bone density refers to the thickness of bones or how tightly the bone tissue is packed.    Preventive Screening tests for Men    Prostate Screening:  Should you have a prostate cancer test (PSA)?  It is up to you to decide if you want a prostate cancer test. Talk to your clinician to find out if the test is right for you.  Things for you to consider and talk about should include:  Benefits and harms of the test  Your family history  How your race/ethnicity may influence the test  If the test may impact other medical conditions you have  Your values on screenings and treatments    *Medicare pays for many preventive services to keep you healthy. For some of these services, you might have to pay a deductible, coinsurance, and / or copayment.  The amounts vary depending on the type of services you need and the kind of Medicare health plan you have.    For further details on screenings offered by Medicare please visit: https://www.medicare.gov/coverage/preventive-screening-services      4 = No assist / stand by assistance

## 2024-06-27 ENCOUNTER — APPOINTMENT (OUTPATIENT)
Dept: PEDIATRIC ALLERGY IMMUNOLOGY | Facility: CLINIC | Age: 84
End: 2024-06-27
Payer: MEDICARE

## 2024-06-27 DIAGNOSIS — J30.89 OTHER ALLERGIC RHINITIS: ICD-10-CM

## 2024-06-27 DIAGNOSIS — H10.45 OTHER CHRONIC ALLERGIC CONJUNCTIVITIS: ICD-10-CM

## 2024-06-27 DIAGNOSIS — J30.1 ALLERGIC RHINITIS DUE TO POLLEN: ICD-10-CM

## 2024-06-27 PROCEDURE — 95117 IMMUNOTHERAPY INJECTIONS: CPT

## 2024-07-09 ENCOUNTER — APPOINTMENT (OUTPATIENT)
Dept: PEDIATRIC ALLERGY IMMUNOLOGY | Facility: CLINIC | Age: 84
End: 2024-07-09
Payer: MEDICARE

## 2024-07-09 DIAGNOSIS — J30.89 OTHER ALLERGIC RHINITIS: ICD-10-CM

## 2024-07-09 PROCEDURE — 95117 IMMUNOTHERAPY INJECTIONS: CPT

## 2024-07-23 ENCOUNTER — APPOINTMENT (OUTPATIENT)
Dept: PEDIATRIC ALLERGY IMMUNOLOGY | Facility: CLINIC | Age: 84
End: 2024-07-23
Payer: MEDICARE

## 2024-07-23 DIAGNOSIS — J30.89 OTHER ALLERGIC RHINITIS: ICD-10-CM

## 2024-07-23 DIAGNOSIS — J30.1 ALLERGIC RHINITIS DUE TO POLLEN: ICD-10-CM

## 2024-07-23 DIAGNOSIS — H10.45 OTHER CHRONIC ALLERGIC CONJUNCTIVITIS: ICD-10-CM

## 2024-07-23 PROCEDURE — 95117 IMMUNOTHERAPY INJECTIONS: CPT

## 2024-08-06 ENCOUNTER — APPOINTMENT (OUTPATIENT)
Dept: PEDIATRIC ALLERGY IMMUNOLOGY | Facility: CLINIC | Age: 84
End: 2024-08-06

## 2024-08-06 PROCEDURE — 95117 IMMUNOTHERAPY INJECTIONS: CPT

## 2024-08-20 ENCOUNTER — APPOINTMENT (OUTPATIENT)
Dept: PEDIATRIC ALLERGY IMMUNOLOGY | Facility: CLINIC | Age: 84
End: 2024-08-20
Payer: MEDICARE

## 2024-08-20 DIAGNOSIS — H10.45 OTHER CHRONIC ALLERGIC CONJUNCTIVITIS: ICD-10-CM

## 2024-08-20 DIAGNOSIS — J30.89 OTHER ALLERGIC RHINITIS: ICD-10-CM

## 2024-08-20 DIAGNOSIS — J30.1 ALLERGIC RHINITIS DUE TO POLLEN: ICD-10-CM

## 2024-08-20 PROCEDURE — 95117 IMMUNOTHERAPY INJECTIONS: CPT

## 2024-09-03 ENCOUNTER — APPOINTMENT (OUTPATIENT)
Dept: PEDIATRIC ALLERGY IMMUNOLOGY | Facility: CLINIC | Age: 84
End: 2024-09-03
Payer: MEDICARE

## 2024-09-03 DIAGNOSIS — J30.1 ALLERGIC RHINITIS DUE TO POLLEN: ICD-10-CM

## 2024-09-03 DIAGNOSIS — J30.89 OTHER ALLERGIC RHINITIS: ICD-10-CM

## 2024-09-03 DIAGNOSIS — H10.45 OTHER CHRONIC ALLERGIC CONJUNCTIVITIS: ICD-10-CM

## 2024-09-03 PROCEDURE — 95117 IMMUNOTHERAPY INJECTIONS: CPT

## 2024-09-17 ENCOUNTER — APPOINTMENT (OUTPATIENT)
Dept: PEDIATRIC ALLERGY IMMUNOLOGY | Facility: CLINIC | Age: 84
End: 2024-09-17
Payer: MEDICARE

## 2024-09-17 DIAGNOSIS — J30.1 ALLERGIC RHINITIS DUE TO POLLEN: ICD-10-CM

## 2024-09-17 DIAGNOSIS — J30.89 OTHER ALLERGIC RHINITIS: ICD-10-CM

## 2024-09-17 DIAGNOSIS — H10.45 OTHER CHRONIC ALLERGIC CONJUNCTIVITIS: ICD-10-CM

## 2024-09-17 PROCEDURE — 95117 IMMUNOTHERAPY INJECTIONS: CPT

## 2024-09-23 ENCOUNTER — APPOINTMENT (OUTPATIENT)
Dept: OBGYN | Facility: CLINIC | Age: 84
End: 2024-09-23

## 2024-10-08 ENCOUNTER — APPOINTMENT (OUTPATIENT)
Dept: PEDIATRIC ALLERGY IMMUNOLOGY | Facility: CLINIC | Age: 84
End: 2024-10-08
Payer: MEDICARE

## 2024-10-08 ENCOUNTER — APPOINTMENT (OUTPATIENT)
Dept: PEDIATRIC ALLERGY IMMUNOLOGY | Facility: CLINIC | Age: 84
End: 2024-10-08

## 2024-10-08 PROCEDURE — 95117 IMMUNOTHERAPY INJECTIONS: CPT

## 2024-10-15 ENCOUNTER — APPOINTMENT (OUTPATIENT)
Dept: PEDIATRIC ALLERGY IMMUNOLOGY | Facility: CLINIC | Age: 84
End: 2024-10-15
Payer: MEDICARE

## 2024-10-15 DIAGNOSIS — J30.89 OTHER ALLERGIC RHINITIS: ICD-10-CM

## 2024-10-15 DIAGNOSIS — J30.1 ALLERGIC RHINITIS DUE TO POLLEN: ICD-10-CM

## 2024-10-15 DIAGNOSIS — H10.45 OTHER CHRONIC ALLERGIC CONJUNCTIVITIS: ICD-10-CM

## 2024-10-15 PROCEDURE — 95117 IMMUNOTHERAPY INJECTIONS: CPT

## 2024-10-29 ENCOUNTER — APPOINTMENT (OUTPATIENT)
Dept: PEDIATRIC ALLERGY IMMUNOLOGY | Facility: CLINIC | Age: 84
End: 2024-10-29
Payer: MEDICARE

## 2024-10-29 DIAGNOSIS — H10.45 OTHER CHRONIC ALLERGIC CONJUNCTIVITIS: ICD-10-CM

## 2024-10-29 DIAGNOSIS — J30.89 OTHER ALLERGIC RHINITIS: ICD-10-CM

## 2024-10-29 DIAGNOSIS — J30.1 ALLERGIC RHINITIS DUE TO POLLEN: ICD-10-CM

## 2024-10-29 PROCEDURE — 95117 IMMUNOTHERAPY INJECTIONS: CPT

## 2024-10-30 ENCOUNTER — APPOINTMENT (OUTPATIENT)
Dept: OPHTHALMOLOGY | Facility: CLINIC | Age: 84
End: 2024-10-30
Payer: MEDICARE

## 2024-10-30 ENCOUNTER — NON-APPOINTMENT (OUTPATIENT)
Age: 84
End: 2024-10-30

## 2024-10-30 PROCEDURE — 92014 COMPRE OPH EXAM EST PT 1/>: CPT

## 2024-11-12 ENCOUNTER — APPOINTMENT (OUTPATIENT)
Dept: PEDIATRIC ALLERGY IMMUNOLOGY | Facility: CLINIC | Age: 84
End: 2024-11-12
Payer: MEDICARE

## 2024-11-12 PROCEDURE — 95117 IMMUNOTHERAPY INJECTIONS: CPT

## 2024-11-19 ENCOUNTER — APPOINTMENT (OUTPATIENT)
Dept: PEDIATRIC ALLERGY IMMUNOLOGY | Facility: CLINIC | Age: 84
End: 2024-11-19
Payer: MEDICARE

## 2024-11-19 PROCEDURE — 95117 IMMUNOTHERAPY INJECTIONS: CPT

## 2024-11-20 ENCOUNTER — APPOINTMENT (OUTPATIENT)
Dept: PEDIATRIC ALLERGY IMMUNOLOGY | Facility: CLINIC | Age: 84
End: 2024-11-20
Payer: MEDICARE

## 2024-11-20 DIAGNOSIS — H10.45 OTHER CHRONIC ALLERGIC CONJUNCTIVITIS: ICD-10-CM

## 2024-11-20 DIAGNOSIS — J30.89 OTHER ALLERGIC RHINITIS: ICD-10-CM

## 2024-11-20 DIAGNOSIS — J30.1 ALLERGIC RHINITIS DUE TO POLLEN: ICD-10-CM

## 2024-11-20 PROCEDURE — 95165 ANTIGEN THERAPY SERVICES: CPT

## 2024-11-26 ENCOUNTER — APPOINTMENT (OUTPATIENT)
Dept: PEDIATRIC ALLERGY IMMUNOLOGY | Facility: CLINIC | Age: 84
End: 2024-11-26

## 2024-12-03 ENCOUNTER — APPOINTMENT (OUTPATIENT)
Dept: PEDIATRIC ALLERGY IMMUNOLOGY | Facility: CLINIC | Age: 84
End: 2024-12-03
Payer: MEDICARE

## 2024-12-03 DIAGNOSIS — J30.89 OTHER ALLERGIC RHINITIS: ICD-10-CM

## 2024-12-03 DIAGNOSIS — J30.1 ALLERGIC RHINITIS DUE TO POLLEN: ICD-10-CM

## 2024-12-03 DIAGNOSIS — H10.45 OTHER CHRONIC ALLERGIC CONJUNCTIVITIS: ICD-10-CM

## 2024-12-03 PROCEDURE — 95117 IMMUNOTHERAPY INJECTIONS: CPT

## 2024-12-17 ENCOUNTER — APPOINTMENT (OUTPATIENT)
Dept: PEDIATRIC ALLERGY IMMUNOLOGY | Facility: CLINIC | Age: 84
End: 2024-12-17
Payer: MEDICARE

## 2024-12-17 DIAGNOSIS — J30.89 OTHER ALLERGIC RHINITIS: ICD-10-CM

## 2024-12-17 DIAGNOSIS — H10.45 OTHER CHRONIC ALLERGIC CONJUNCTIVITIS: ICD-10-CM

## 2024-12-17 DIAGNOSIS — J30.1 ALLERGIC RHINITIS DUE TO POLLEN: ICD-10-CM

## 2024-12-17 PROCEDURE — 95117 IMMUNOTHERAPY INJECTIONS: CPT

## 2024-12-30 ENCOUNTER — APPOINTMENT (OUTPATIENT)
Dept: PEDIATRIC ALLERGY IMMUNOLOGY | Facility: CLINIC | Age: 84
End: 2024-12-30
Payer: MEDICARE

## 2024-12-30 PROCEDURE — 95117 IMMUNOTHERAPY INJECTIONS: CPT

## 2025-01-07 ENCOUNTER — APPOINTMENT (OUTPATIENT)
Dept: PEDIATRIC ALLERGY IMMUNOLOGY | Facility: CLINIC | Age: 85
End: 2025-01-07
Payer: MEDICARE

## 2025-01-07 DIAGNOSIS — J30.89 OTHER ALLERGIC RHINITIS: ICD-10-CM

## 2025-01-07 DIAGNOSIS — H10.45 OTHER CHRONIC ALLERGIC CONJUNCTIVITIS: ICD-10-CM

## 2025-01-07 DIAGNOSIS — J30.1 ALLERGIC RHINITIS DUE TO POLLEN: ICD-10-CM

## 2025-01-07 PROCEDURE — 95117 IMMUNOTHERAPY INJECTIONS: CPT

## 2025-01-21 ENCOUNTER — APPOINTMENT (OUTPATIENT)
Dept: PEDIATRIC ALLERGY IMMUNOLOGY | Facility: CLINIC | Age: 85
End: 2025-01-21

## 2025-02-04 ENCOUNTER — APPOINTMENT (OUTPATIENT)
Dept: PEDIATRIC ALLERGY IMMUNOLOGY | Facility: CLINIC | Age: 85
End: 2025-02-04
Payer: MEDICARE

## 2025-02-04 DIAGNOSIS — H10.45 OTHER CHRONIC ALLERGIC CONJUNCTIVITIS: ICD-10-CM

## 2025-02-04 DIAGNOSIS — J30.89 OTHER ALLERGIC RHINITIS: ICD-10-CM

## 2025-02-04 DIAGNOSIS — J30.1 ALLERGIC RHINITIS DUE TO POLLEN: ICD-10-CM

## 2025-02-04 PROCEDURE — 95117 IMMUNOTHERAPY INJECTIONS: CPT

## 2025-02-18 ENCOUNTER — APPOINTMENT (OUTPATIENT)
Dept: PEDIATRIC ALLERGY IMMUNOLOGY | Facility: CLINIC | Age: 85
End: 2025-02-18
Payer: MEDICARE

## 2025-02-18 DIAGNOSIS — J30.89 OTHER ALLERGIC RHINITIS: ICD-10-CM

## 2025-02-18 DIAGNOSIS — H10.45 OTHER CHRONIC ALLERGIC CONJUNCTIVITIS: ICD-10-CM

## 2025-02-18 DIAGNOSIS — J30.1 ALLERGIC RHINITIS DUE TO POLLEN: ICD-10-CM

## 2025-02-18 PROCEDURE — 95117 IMMUNOTHERAPY INJECTIONS: CPT

## 2025-03-04 ENCOUNTER — APPOINTMENT (OUTPATIENT)
Dept: PEDIATRIC ALLERGY IMMUNOLOGY | Facility: CLINIC | Age: 85
End: 2025-03-04
Payer: MEDICARE

## 2025-03-04 DIAGNOSIS — J30.89 OTHER ALLERGIC RHINITIS: ICD-10-CM

## 2025-03-04 DIAGNOSIS — H10.45 OTHER CHRONIC ALLERGIC CONJUNCTIVITIS: ICD-10-CM

## 2025-03-04 DIAGNOSIS — J30.1 ALLERGIC RHINITIS DUE TO POLLEN: ICD-10-CM

## 2025-03-04 PROCEDURE — 95117 IMMUNOTHERAPY INJECTIONS: CPT

## 2025-03-19 ENCOUNTER — APPOINTMENT (OUTPATIENT)
Dept: PEDIATRIC ALLERGY IMMUNOLOGY | Facility: CLINIC | Age: 85
End: 2025-03-19
Payer: MEDICARE

## 2025-03-19 DIAGNOSIS — J30.89 OTHER ALLERGIC RHINITIS: ICD-10-CM

## 2025-03-19 DIAGNOSIS — H10.45 OTHER CHRONIC ALLERGIC CONJUNCTIVITIS: ICD-10-CM

## 2025-03-19 DIAGNOSIS — J30.1 ALLERGIC RHINITIS DUE TO POLLEN: ICD-10-CM

## 2025-03-19 PROCEDURE — 95117 IMMUNOTHERAPY INJECTIONS: CPT

## 2025-04-08 ENCOUNTER — APPOINTMENT (OUTPATIENT)
Dept: PEDIATRIC ALLERGY IMMUNOLOGY | Facility: CLINIC | Age: 85
End: 2025-04-08
Payer: MEDICARE

## 2025-04-08 DIAGNOSIS — J30.89 OTHER ALLERGIC RHINITIS: ICD-10-CM

## 2025-04-08 DIAGNOSIS — J30.1 ALLERGIC RHINITIS DUE TO POLLEN: ICD-10-CM

## 2025-04-08 DIAGNOSIS — H10.45 OTHER CHRONIC ALLERGIC CONJUNCTIVITIS: ICD-10-CM

## 2025-04-08 PROCEDURE — 95117 IMMUNOTHERAPY INJECTIONS: CPT

## 2025-04-22 ENCOUNTER — APPOINTMENT (OUTPATIENT)
Dept: PEDIATRIC ALLERGY IMMUNOLOGY | Facility: CLINIC | Age: 85
End: 2025-04-22
Payer: MEDICARE

## 2025-04-22 DIAGNOSIS — J30.89 OTHER ALLERGIC RHINITIS: ICD-10-CM

## 2025-04-22 DIAGNOSIS — H10.45 OTHER CHRONIC ALLERGIC CONJUNCTIVITIS: ICD-10-CM

## 2025-04-22 DIAGNOSIS — J30.1 ALLERGIC RHINITIS DUE TO POLLEN: ICD-10-CM

## 2025-04-22 PROCEDURE — 95117 IMMUNOTHERAPY INJECTIONS: CPT

## 2025-05-06 ENCOUNTER — APPOINTMENT (OUTPATIENT)
Dept: PEDIATRIC ALLERGY IMMUNOLOGY | Facility: CLINIC | Age: 85
End: 2025-05-06
Payer: MEDICARE

## 2025-05-06 DIAGNOSIS — J30.89 OTHER ALLERGIC RHINITIS: ICD-10-CM

## 2025-05-06 DIAGNOSIS — J30.1 ALLERGIC RHINITIS DUE TO POLLEN: ICD-10-CM

## 2025-05-06 DIAGNOSIS — H10.45 OTHER CHRONIC ALLERGIC CONJUNCTIVITIS: ICD-10-CM

## 2025-05-06 PROCEDURE — 95117 IMMUNOTHERAPY INJECTIONS: CPT

## 2025-05-20 ENCOUNTER — APPOINTMENT (OUTPATIENT)
Dept: PEDIATRIC ALLERGY IMMUNOLOGY | Facility: CLINIC | Age: 85
End: 2025-05-20
Payer: MEDICARE

## 2025-05-20 DIAGNOSIS — H10.45 OTHER CHRONIC ALLERGIC CONJUNCTIVITIS: ICD-10-CM

## 2025-05-20 DIAGNOSIS — J30.89 OTHER ALLERGIC RHINITIS: ICD-10-CM

## 2025-05-20 DIAGNOSIS — J30.1 ALLERGIC RHINITIS DUE TO POLLEN: ICD-10-CM

## 2025-05-20 PROCEDURE — 95117 IMMUNOTHERAPY INJECTIONS: CPT

## 2025-05-28 ENCOUNTER — APPOINTMENT (OUTPATIENT)
Dept: OPHTHALMOLOGY | Facility: CLINIC | Age: 85
End: 2025-05-28
Payer: MEDICARE

## 2025-05-28 ENCOUNTER — NON-APPOINTMENT (OUTPATIENT)
Age: 85
End: 2025-05-28

## 2025-05-28 PROCEDURE — 92014 COMPRE OPH EXAM EST PT 1/>: CPT

## 2025-06-17 ENCOUNTER — APPOINTMENT (OUTPATIENT)
Dept: PEDIATRIC ALLERGY IMMUNOLOGY | Facility: CLINIC | Age: 85
End: 2025-06-17
Payer: MEDICARE

## 2025-06-17 PROCEDURE — 95117 IMMUNOTHERAPY INJECTIONS: CPT

## 2025-06-19 NOTE — H&P ADULT - PROBLEM SELECTOR PLAN 3
cont home med Duration Of Freeze Thaw-Cycle (Seconds): 0 Post-Care Instructions: I reviewed with the patient in detail post-care instructions. Patient is to wear sunprotection, and avoid picking at any of the treated lesions. Pt may apply Vaseline to crusted or scabbing areas. Render Post-Care Instructions In Note?: no Consent: The patient's consent was obtained including but not limited to risks of crusting, scabbing, blistering, scarring, darker or lighter pigmentary change, recurrence, incomplete removal and infection. Show Aperture Variable?: Yes Detail Level: Detailed

## 2025-06-24 ENCOUNTER — APPOINTMENT (OUTPATIENT)
Dept: PEDIATRIC ALLERGY IMMUNOLOGY | Facility: CLINIC | Age: 85
End: 2025-06-24
Payer: MEDICARE

## 2025-06-24 PROCEDURE — 95117 IMMUNOTHERAPY INJECTIONS: CPT

## 2025-07-08 ENCOUNTER — APPOINTMENT (OUTPATIENT)
Dept: PEDIATRIC ALLERGY IMMUNOLOGY | Facility: CLINIC | Age: 85
End: 2025-07-08
Payer: MEDICARE

## 2025-07-08 PROCEDURE — 95117 IMMUNOTHERAPY INJECTIONS: CPT

## 2025-07-22 ENCOUNTER — APPOINTMENT (OUTPATIENT)
Dept: PEDIATRIC ALLERGY IMMUNOLOGY | Facility: CLINIC | Age: 85
End: 2025-07-22
Payer: MEDICARE

## 2025-07-22 PROCEDURE — 95117 IMMUNOTHERAPY INJECTIONS: CPT

## 2025-07-23 ENCOUNTER — APPOINTMENT (OUTPATIENT)
Dept: OPHTHALMOLOGY | Facility: CLINIC | Age: 85
End: 2025-07-23
Payer: MEDICARE

## 2025-07-23 ENCOUNTER — NON-APPOINTMENT (OUTPATIENT)
Age: 85
End: 2025-07-23

## 2025-07-23 PROCEDURE — 66821 AFTER CATARACT LASER SURGERY: CPT | Mod: LT

## 2025-08-04 ENCOUNTER — APPOINTMENT (OUTPATIENT)
Dept: PEDIATRIC ALLERGY IMMUNOLOGY | Facility: CLINIC | Age: 85
End: 2025-08-04
Payer: MEDICARE

## 2025-08-04 DIAGNOSIS — J30.89 OTHER ALLERGIC RHINITIS: ICD-10-CM

## 2025-08-04 PROCEDURE — 95165 ANTIGEN THERAPY SERVICES: CPT

## 2025-08-05 ENCOUNTER — APPOINTMENT (OUTPATIENT)
Dept: PEDIATRIC ALLERGY IMMUNOLOGY | Facility: CLINIC | Age: 85
End: 2025-08-05
Payer: MEDICARE

## 2025-08-05 DIAGNOSIS — J30.89 OTHER ALLERGIC RHINITIS: ICD-10-CM

## 2025-08-05 DIAGNOSIS — J30.1 ALLERGIC RHINITIS DUE TO POLLEN: ICD-10-CM

## 2025-08-05 DIAGNOSIS — H10.45 OTHER CHRONIC ALLERGIC CONJUNCTIVITIS: ICD-10-CM

## 2025-08-05 PROCEDURE — 95117 IMMUNOTHERAPY INJECTIONS: CPT

## 2025-08-19 ENCOUNTER — APPOINTMENT (OUTPATIENT)
Dept: PEDIATRIC ALLERGY IMMUNOLOGY | Facility: CLINIC | Age: 85
End: 2025-08-19
Payer: MEDICARE

## 2025-08-19 DIAGNOSIS — J30.1 ALLERGIC RHINITIS DUE TO POLLEN: ICD-10-CM

## 2025-08-19 DIAGNOSIS — J30.89 OTHER ALLERGIC RHINITIS: ICD-10-CM

## 2025-08-19 DIAGNOSIS — H10.45 OTHER CHRONIC ALLERGIC CONJUNCTIVITIS: ICD-10-CM

## 2025-08-19 PROCEDURE — 95117 IMMUNOTHERAPY INJECTIONS: CPT

## 2025-08-21 ENCOUNTER — NON-APPOINTMENT (OUTPATIENT)
Age: 85
End: 2025-08-21

## 2025-08-21 ENCOUNTER — APPOINTMENT (OUTPATIENT)
Dept: OPHTHALMOLOGY | Facility: CLINIC | Age: 85
End: 2025-08-21
Payer: MEDICARE

## 2025-08-21 PROCEDURE — 99024 POSTOP FOLLOW-UP VISIT: CPT

## 2025-09-10 ENCOUNTER — APPOINTMENT (OUTPATIENT)
Dept: PEDIATRIC ALLERGY IMMUNOLOGY | Facility: CLINIC | Age: 85
End: 2025-09-10
Payer: MEDICARE

## 2025-09-10 DIAGNOSIS — J30.89 OTHER ALLERGIC RHINITIS: ICD-10-CM

## 2025-09-10 DIAGNOSIS — H10.45 OTHER CHRONIC ALLERGIC CONJUNCTIVITIS: ICD-10-CM

## 2025-09-10 DIAGNOSIS — J30.1 ALLERGIC RHINITIS DUE TO POLLEN: ICD-10-CM

## 2025-09-10 PROCEDURE — 95117 IMMUNOTHERAPY INJECTIONS: CPT
